# Patient Record
Sex: MALE | Race: WHITE | NOT HISPANIC OR LATINO | Employment: FULL TIME | URBAN - METROPOLITAN AREA
[De-identification: names, ages, dates, MRNs, and addresses within clinical notes are randomized per-mention and may not be internally consistent; named-entity substitution may affect disease eponyms.]

---

## 2018-06-27 ENCOUNTER — RADIANT APPOINTMENT (OUTPATIENT)
Dept: GENERAL RADIOLOGY | Facility: CLINIC | Age: 48
End: 2018-06-27
Attending: FAMILY MEDICINE
Payer: COMMERCIAL

## 2018-06-27 ENCOUNTER — OFFICE VISIT (OUTPATIENT)
Dept: FAMILY MEDICINE | Facility: CLINIC | Age: 48
End: 2018-06-27
Payer: COMMERCIAL

## 2018-06-27 VITALS
TEMPERATURE: 97.9 F | RESPIRATION RATE: 12 BRPM | HEART RATE: 86 BPM | DIASTOLIC BLOOD PRESSURE: 81 MMHG | HEIGHT: 70 IN | WEIGHT: 255 LBS | SYSTOLIC BLOOD PRESSURE: 127 MMHG | BODY MASS INDEX: 36.51 KG/M2 | OXYGEN SATURATION: 97 %

## 2018-06-27 DIAGNOSIS — M25.512 CHRONIC LEFT SHOULDER PAIN: ICD-10-CM

## 2018-06-27 DIAGNOSIS — G89.29 CHRONIC LEFT SHOULDER PAIN: ICD-10-CM

## 2018-06-27 DIAGNOSIS — Z91.89 10 YEAR RISK OF MI OR STROKE < 7.5%: Primary | ICD-10-CM

## 2018-06-27 PROCEDURE — 99213 OFFICE O/P EST LOW 20 MIN: CPT | Performed by: FAMILY MEDICINE

## 2018-06-27 PROCEDURE — 73050 X-RAY EXAM OF SHOULDERS: CPT | Mod: FY

## 2018-06-27 NOTE — PATIENT INSTRUCTIONS
The 10-year ASCVD risk score (Melinda HUNTER Jr et al., 2013) is: 3.1%    Values used to calculate the score:      Age: 47 years      Sex: Male      Is Non- : No      Diabetic: No      Tobacco smoker: No      Systolic Blood Pressure: 127 mmHg      Is BP treated: No      HDL Cholesterol: 41 mg/dL      Total Cholesterol: 213 mg/dL

## 2018-06-27 NOTE — MR AVS SNAPSHOT
After Visit Summary   6/27/2018    Petar Chapman    MRN: [id number]           Patient Information     Date Of Birth          1970        Visit Information        Provider Department      6/27/2018 4:00 PM Beryl Malik MD [facility]        Today's Diagnoses     10 year risk of MI or stroke < 7.5%    -  1    Chronic left shoulder pain          Care Instructions    The 10-year ASCVD risk score (Melinda HUNTER Jr, et al., 2013) is: 3.1%    Values used to calculate the score:      Age: 47 years      Sex: Male      Is Non- : No      Diabetic: No      Tobacco smoker: No      Systolic Blood Pressure: 127 mmHg      Is BP treated: No      HDL Cholesterol: 41 mg/dL      Total Cholesterol: 213 mg/dL            Follow-ups after your visit        Additional Services     ORTHOPEDICS ADULT REFERRAL       Your provider has referred you to: [facility]: [facility] [place] [phone]   [email or web address]    Please be aware that coverage of these services is subject to the terms and limitations of your health insurance plan.  Call member services at your health plan with any benefit or coverage questions.      Please bring the following to your appointment:    >>   Any x-rays, CTs or MRIs which have been performed.  Contact the facility where they were done to arrange for  prior to your scheduled appointment.    >>   List of current medications   >>   This referral request   >>   Any documents/labs given to you for this referral                  Your next 10 appointments already scheduled     Jul 17, 2018  7:30 AM CDT   LAB with AN LAB   [facility] ([facility]    [address] [facility] [address] [phone]           Please do not eat 10-12 hours before your appointment if you are coming in fasting for labs on lipids, cholesterol, or glucose (sugar). This does not apply to  "pregnant women. Water, hot tea and black coffee (with nothing added) are okay. Do not drink other fluids, diet soda or chew gum.            Jul 23, 2018  7:00 AM CDT   PHYSICAL with [facility]ik [facility]   [facility]    [address] [facility] [address] [phone]              Who to contact     If you have questions or need follow up information about today's clinic visit or your schedule please contact [facility] directly at [phone].  Normal or non-critical lab and imaging results will be communicated to you by Compringhart, letter or phone within 4 business days after the clinic has received the results. If you do not hear from us within 7 days, please contact the clinic through AppSociallyt or phone. If you have a critical or abnormal lab result, we will notify you by phone as soon as possible.  Submit refill requests through NavPrescience or call your pharmacy and they will forward the refill request to us. Please allow 3 business days for your refill to be completed.          Additional Information About Your Visit        Compringhart Information     NavPrescience gives you secure access to your electronic health record. If you see a primary care provider, you can also send messages to your care team and make appointments. If you have questions, please call your primary care clinic.  If you do not have a primary care provider, please call [phone] and they will assist you.        Care EveryWhere ID     This is your Care EveryWhere ID. This could be used by other organizations to access your [place] medical records  [id number]        Your Vitals Were     Pulse Temperature Respirations Height Pulse Oximetry BMI (Body Mass Index)    86 97.9  F (36.6  C) (Oral) 12 5' 10\" (1.778 m) 97% 36.59 kg/m2       Blood Pressure from Last 3 Encounters:   06/27/18 127/81   07/14/15 129/88   06/29/15 (!) 129/97    Weight from Last 3 Encounters:   06/27/18 255 lb (115.7 kg) "   07/14/15 240 lb (108.9 kg)   06/29/15 240 lb (108.9 kg)              We Performed the Following     ORTHOPEDICS ADULT REFERRAL        Primary Care Provider Office Phone # Fax #    Peter Armendariz [phone] [id number] [address]        Equal Access to Services     [facility]: Hadii aad ku hadasho Soomaali, waaxda luqadaha, qaybta kaalmada adeegyada, waxay idiin hayaan adeeg kharash la'aan . So [facility] [phone].    ATENCIÓN: Si habla español, tiene a rodriguez disposición servicios gratuitos de asistencia lingüística. Llame al [phone].    We comply with applicable federal civil rights laws and [place] laws. We do not discriminate on the basis of race, color, national origin, age, disability, sex, sexual orientation, or gender identity.            Thank you!     Thank you for choosing [facility]  for your care. Our goal is always to provide you with excellent care. Hearing back from our patients is one way we can continue to improve our services. Please take a few minutes to complete the written survey that you may receive in the mail after your visit with us. Thank you!             Your Updated Medication List - Protect others around you: Learn how to safely use, store and throw away your medicines at www.disposemymeds.org.      Notice  As of 6/27/2018  4:42 PM    You have not been prescribed any medications.

## 2018-06-27 NOTE — PROGRESS NOTES
"  SUBJECTIVE:   Petar Chapman is a 47 year old male who presents to clinic today for the following health issues:      Joint Pain: Left shoulder pain    Onset: last 4 months bad shoulder pain. Been there for years getting worse    Description:   Location: left shoulder  Character: Stabbing    Intensity: moderate    Progression of Symptoms: worse    Accompanying Signs & Symptoms:  Other symptoms: weakness    History:   Previous similar pain: no       Precipitating factors:   Trauma or overuse: no     Alleviating factors:  Improved by: nothing    Therapies Tried and outcome: none    The 10-year ASCVD risk score ([place] ELSA Jr, et al., 2013) is: 3.1%    Values used to calculate the score:      Age: 47 years      Sex: Male      Is Non- : No      Diabetic: No      Tobacco smoker: No      Systolic Blood Pressure: 127 mmHg      Is BP treated: No      HDL Cholesterol: 41 mg/dL      Total Cholesterol: 213 mg/dL      Pt with h/o shoulder separations bilaterally. They have hurt in the past  More recently left one is hurting more than right. Has been getting progressively worse over the past 4 months  Now hurts with nearly every use of that arm    Problem list and histories reviewed & adjusted, as indicated.  Additional history: as documented    Labs reviewed in EPIC    Reviewed and updated as needed this visit by clinical staff  Tobacco  Allergies  Meds  Med Hx  Surg Hx  Fam Hx  Soc Hx      Reviewed and updated as needed this visit by Provider         ROS:  Constitutional, HEENT, cardiovascular, pulmonary, gi and gu systems are negative, except as otherwise noted.    OBJECTIVE:     /81  Pulse 86  Temp 97.9  F (36.6  C) (Oral)  Resp 12  Ht 5' 10\" (1.778 m)  Wt 255 lb (115.7 kg)  SpO2 97%  BMI 36.59 kg/m2  Body mass index is 36.59 kg/(m^2).  GENERAL: healthy, alert and no distress  MS: no gross musculoskeletal defects noted, no edema, pain to palpation over left AC joint. No pain to " palpation over upper left arm  No pain to palpation over cervical spine    Diagnostic Test Results:  Xray - bilateral AC joint:  Arthritic changes noted over left AC joint    ASSESSMENT/PLAN:     1. Chronic left shoulder pain  Referral to sports med/ ? Injection of AC joint  - XR Acromioclavicular Joint Bilateral; Future  - ORTHOPEDICS ADULT REFERRAL    2. 10 year risk of MI or stroke < 7.5%  Reviewed cholesterol results.           Beryl Bower MD  [facility]

## 2018-07-05 ENCOUNTER — DOCUMENTATION ONLY (OUTPATIENT)
Dept: LAB | Facility: CLINIC | Age: 48
End: 2018-07-05

## 2018-07-05 DIAGNOSIS — Z13.1 SCREENING FOR DIABETES MELLITUS: ICD-10-CM

## 2018-07-05 DIAGNOSIS — Z13.6 CARDIOVASCULAR SCREENING; LDL GOAL LESS THAN 160: Primary | ICD-10-CM

## 2018-07-05 NOTE — PROGRESS NOTES
Patient has an upcoming previsit appointment on 07/17/2018. Please review pended orders and add additional orders if needed.     Thank you,   Halina Castellon

## 2018-07-09 ENCOUNTER — OFFICE VISIT (OUTPATIENT)
Dept: ORTHOPEDICS | Facility: CLINIC | Age: 48
End: 2018-07-09
Payer: COMMERCIAL

## 2018-07-09 ENCOUNTER — RADIANT APPOINTMENT (OUTPATIENT)
Dept: GENERAL RADIOLOGY | Facility: CLINIC | Age: 48
End: 2018-07-09
Attending: PEDIATRICS
Payer: COMMERCIAL

## 2018-07-09 VITALS
WEIGHT: 253 LBS | DIASTOLIC BLOOD PRESSURE: 89 MMHG | HEIGHT: 70 IN | SYSTOLIC BLOOD PRESSURE: 128 MMHG | BODY MASS INDEX: 36.22 KG/M2

## 2018-07-09 DIAGNOSIS — M25.512 LEFT SHOULDER PAIN: ICD-10-CM

## 2018-07-09 DIAGNOSIS — M19.019 AC (ACROMIOCLAVICULAR) ARTHRITIS: ICD-10-CM

## 2018-07-09 DIAGNOSIS — G89.29 CHRONIC LEFT SHOULDER PAIN: Primary | ICD-10-CM

## 2018-07-09 DIAGNOSIS — M25.812 SHOULDER IMPINGEMENT, LEFT: ICD-10-CM

## 2018-07-09 DIAGNOSIS — M75.102 ROTATOR CUFF SYNDROME, LEFT: ICD-10-CM

## 2018-07-09 DIAGNOSIS — M25.512 CHRONIC LEFT SHOULDER PAIN: Primary | ICD-10-CM

## 2018-07-09 PROCEDURE — 73030 X-RAY EXAM OF SHOULDER: CPT | Mod: LT

## 2018-07-09 PROCEDURE — 99203 OFFICE O/P NEW LOW 30 MIN: CPT | Performed by: PEDIATRICS

## 2018-07-09 NOTE — PATIENT INSTRUCTIONS
Plan:  - Today's Plan of Care:  Rehab: Physical Therapy: [place] [facility] - [phone]    -We also discussed other future treatment options:  MRI of the shoulder    Follow Up: as needed    If you have any further questions for your physician or physician s care team you can call [phone] and use option 3 to leave a voice message. Calls received during business hours will be returned same day.
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
## 2018-07-09 NOTE — MR AVS SNAPSHOT
After Visit Summary   7/9/2018    Petar Chapman    MRN: [id number]           Patient Information     Date Of Birth          1970        Visit Information        Provider Department      7/9/2018 9:00 AM Sanjuana Lew MD [facility] [place]        Today's Diagnoses     Chronic left shoulder pain    -  1    AC (acromioclavicular) arthritis        Rotator cuff syndrome, left        Shoulder impingement, left          Care Instructions        Plan:  - Today's Plan of Care:  Rehab: Physical Therapy: [facility] - [phone]    -We also discussed other future treatment options:  MRI of the shoulder    Follow Up: as needed    If you have any further questions for your physician or physician s care team you can call [phone] and use option 3 to leave a voice message. Calls received during business hours will be returned same day.              Follow-ups after your visit        Additional Services     MEDHAT PT, HAND, AND CHIROPRACTIC REFERRAL       **This order will print in the [facility] Scheduling Office**    Physical Therapy, Hand Therapy and Chiropractic Care are available through:    *[facility]  *[facility]  *[facility]    Call one number to schedule at any of the above locations: [phone].    Your provider has referred you to: Physical Therapy at [facility] or [facility]    Indication/Reason for Referral: Shoulder Pain  Onset of Illness: chronic  Therapy Orders: Evaluate and Treat  Special Programs:None  Special Request: None    Christy Harrison      Additional Comments for the Therapist or Chiropractor:     Please be aware that coverage of these services is subject to the terms and limitations of your health insurance plan.  Call member services at your health plan with any benefit or coverage questions.      Please bring the following to your appointment:    *Your personal calendar for scheduling future  appointments  *Comfortable clothing                  Your next 10 appointments already scheduled     Jul 17, 2018  7:30 AM CDT   [facility]    [address] [address] [phone]           Please do not eat 10-12 hours before your appointment if you are coming in fasting for labs on lipids, cholesterol, or glucose (sugar). This does not apply to pregnant women. Water, hot tea and black coffee (with nothing added) are okay. Do not drink other fluids, diet soda or chew gum.            Jul 23, 2018  7:00 AM CDT   [facility]YSICAL [facility]   [facility]    [address] [address] [phone]              Who to contact     If you have questions or need follow up information about today's clinic visit or your schedule please contact [facility] directly at [phone].  Normal or non-critical lab and imaging results will be communicated to you by [employer], letter or phone within 4 business days after the clinic has received the results. If you do not hear from us within 7 days, please contact the clinic through [employer] or phone. If you have a critical or abnormal lab result, we will notify you by phone as soon as possible.  Submit refill requests through [employer] or call your pharmacy and they will forward the refill request to us. Please allow 3 business days for your refill to be completed.          Additional Information About Your Visit        [employer] Information     [employer] gives you secure access to your electronic health record. If you see a primary care provider, you can also send messages to your care team and make appointments. If you have questions, please call your primary care clinic.  If you do not have a primary care provider, please call [phone] and they will assist you.        Care EveryWhere ID     This is your Care  "EveryWhere ID. This could be used by other organizations to access your [place] medical records  [id number]        Your Vitals Were     Height BMI (Body Mass Index)                5' 10\" (1.778 m) 36.3 kg/m2           Blood Pressure from Last 3 Encounters:   07/09/18 128/89   06/27/18 127/81   07/14/15 129/88    Weight from Last 3 Encounters:   07/09/18 253 lb (114.8 kg)   06/27/18 255 lb (115.7 kg)   07/14/15 240 lb (108.9 kg)              We Performed the Following     MEDHAT PT, HAND, AND CHIROPRACTIC REFERRAL        Primary Care Provider Office Phone # Fax #    Peter Armendariz [phone] [address] [address]        Equal Access to Services     [facility]: Hadii brian briones hadasho Sodanna, waaxda luqadaha, qaybta kaalmada adeegyada, paco parnell . So [facility] [phone].    ATENCIÓN: Si habla español, tiene a rodriguez disposición servicios gratuitos de asistencia lingüística. Oni al [phone].    We comply with applicable federal civil rights laws and [place] laws. We do not discriminate on the basis of race, color, national origin, age, disability, sex, sexual orientation, or gender identity.            Thank you!     Thank you for choosing [facility]  for your care. Our goal is always to provide you with excellent care. Hearing back from our patients is one way we can continue to improve our services. Please take a few minutes to complete the written survey that you may receive in the mail after your visit with us. Thank you!             Your Updated Medication List - Protect others around you: Learn how to safely use, store and throw away your medicines at www.disposemymeds.org.      Notice  As of 7/9/2018  9:36 AM    You have not been prescribed any medications.      "

## 2018-07-17 DIAGNOSIS — Z13.6 CARDIOVASCULAR SCREENING; LDL GOAL LESS THAN 160: ICD-10-CM

## 2018-07-17 DIAGNOSIS — Z13.1 SCREENING FOR DIABETES MELLITUS: ICD-10-CM

## 2018-07-17 LAB
CHOLEST SERPL-MCNC: 199 MG/DL
GLUCOSE SERPL-MCNC: 114 MG/DL (ref 70–99)
HDLC SERPL-MCNC: 42 MG/DL
LDLC SERPL CALC-MCNC: 121 MG/DL
NONHDLC SERPL-MCNC: 157 MG/DL
TRIGL SERPL-MCNC: 180 MG/DL

## 2018-07-17 PROCEDURE — 80061 LIPID PANEL: CPT | Performed by: FAMILY MEDICINE

## 2018-07-17 PROCEDURE — 36415 COLL VENOUS BLD VENIPUNCTURE: CPT | Performed by: FAMILY MEDICINE

## 2018-07-17 PROCEDURE — 82947 ASSAY GLUCOSE BLOOD QUANT: CPT | Performed by: FAMILY MEDICINE

## 2018-07-18 NOTE — PROGRESS NOTES
SUBJECTIVE:   CC: Petar Chapman is an 48 year old male who presents for preventative health visit.     Physical   Annual:     Getting at least 3 servings of Calcium per day:  NO    Bi-annual eye exam:  Yes    Dental care twice a year:  Yes    Sleep apnea or symptoms of sleep apnea:  Daytime drowsiness and Excessive snoring    Diet:  Regular (no restrictions)    Taking medications regularly:  Not Applicable    Medication side effects:  Not applicable    The 10-year ASCVD risk score (Greenviewbailee HUNTER Jr, et al., 2013) is: 3.2%    Values used to calculate the score:      Age: 48 years      Sex: Male      Is Non- : No      Diabetic: No      Tobacco smoker: No      Systolic Blood Pressure: 131 mmHg      Is BP treated: No      HDL Cholesterol: 42 mg/dL      Total Cholesterol: 199 mg/dL          Today's PHQ-2 Score:   PHQ-2 ( 1999 [employer]) 7/23/2018   Q1: Little interest or pleasure in doing things 0   Q2: Feeling down, depressed or hopeless 0   PHQ-2 Score 0   Q1: Little interest or pleasure in doing things Not at all   Q2: Feeling down, depressed or hopeless Not at all   PHQ-2 Score 0       Abuse: Current or Past(Physical, Sexual or Emotional)- No  Do you feel safe in your environment - Yes    Social History   Substance Use Topics     Smoking status: Former Smoker     Smokeless tobacco: Former User     Quit date: 3/15/1995      Comment: snuff   2010     Alcohol use Yes     Alcohol Use 7/23/2018   If you drink alcohol do you typically have greater than 3 drinks per day OR greater than 7 drinks per week? No     Less than 7 drinks per week on the average  Last PSA:   PSA   Date Value Ref Range Status   05/22/2012 3.78 0 - 4 ug/L Final       Reviewed orders with patient. Reviewed health maintenance and updated orders accordingly - Yes  Labs reviewed in EPIC    Reviewed and updated as needed this visit by clinical staff  Tobacco  Allergies  Meds  Med Hx  Surg Hx  Fam Hx  Soc Hx        Reviewed and  "updated as needed this visit by Provider            Review of Systems  CONSTITUTIONAL: NEGATIVE for fever, chills, change in weight  INTEGUMENTARY/SKIN: NEGATIVE for worrisome rashes, moles or lesions  EYES: NEGATIVE for vision changes or irritation  ENT: NEGATIVE for ear, mouth and throat problems  RESP: NEGATIVE for significant cough or SOB  CV: NEGATIVE for chest pain, palpitations or peripheral edema  GI: NEGATIVE for nausea, abdominal pain, heartburn, or change in bowel habits   male: negative for dysuria, hematuria, decreased urinary stream, erectile dysfunction, urethral discharge  MUSCULOSKELETAL: NEGATIVE for significant arthralgias or myalgia  NEURO: NEGATIVE for weakness, dizziness or paresthesias  PSYCHIATRIC: NEGATIVE for changes in mood or affect    OBJECTIVE:   /88  Pulse 80  Temp 97.3  F (36.3  C) (Oral)  Resp 17  Ht 5' 10\" (1.778 m)  Wt 252 lb (114.3 kg)  SpO2 94%  BMI 36.16 kg/m2    Physical Exam  GENERAL: healthy, alert and no distress  EYES: Eyes grossly normal to inspection, PERRL and conjunctivae and sclerae normal  HENT: ear canals and TM's normal, nose and mouth without ulcers or lesions  NECK: no adenopathy, no asymmetry, masses, or scars and thyroid normal to palpation  RESP: lungs clear to auscultation - no rales, rhonchi or wheezes  CV: regular rate and rhythm, normal S1 S2, no S3 or S4, no murmur, click or rub, no peripheral edema and peripheral pulses strong  ABDOMEN: soft, nontender, no hepatosplenomegaly, no masses and bowel sounds normal  MS: no gross musculoskeletal defects noted, no edema  SKIN: no suspicious lesions or rashes  NEURO: Normal strength and tone, mentation intact and speech normal  PSYCH: mentation appears normal, affect normal/bright    Diagnostic Test Results:  none     ASSESSMENT/PLAN:   1. Routine general medical examination at a health care facility      2. Elevated glucose  Recheck a1c  - Hemoglobin A1c    3. Class 2 obesity due to excess calories " "without serious comorbidity with body mass index (BMI) of 35.0 to 35.9 in adult        COUNSELING:   Reviewed preventive health counseling, as reflected in patient instructions       Regular exercise       Healthy diet/nutrition       Vision screening    BP Readings from Last 1 Encounters:   07/23/18 131/88     Estimated body mass index is 36.16 kg/(m^2) as calculated from the following:    Height as of this encounter: 5' 10\" (1.778 m).    Weight as of this encounter: 252 lb (114.3 kg).    BP Screening:   Last 3 BP Readings:    BP Readings from Last 3 Encounters:   07/23/18 131/88   07/09/18 128/89   06/27/18 127/81       The following was recommended to the patient:  Re-screen BP within a year and recommended lifestyle modifications  Weight management plan: Discussed healthy diet and exercise guidelines and patient will follow up in 12 months in clinic to re-evaluate.     reports that he has quit smoking. He quit smokeless tobacco use about 23 years ago.      Counseling Resources:  ATP IV Guidelines  Pooled Cohorts Equation Calculator  FRAX Risk Assessment  ICSI Preventive Guidelines  Dietary Guidelines for Americans, 2010  [employer]'s MyPlate  ASA Prophylaxis  Lung CA Screening    Beryl Bower MD  [facility] [place]  Answers for HPI/ROS submitted by the patient on 7/23/2018   PHQ-2 Score: 0    "

## 2018-07-23 ENCOUNTER — OFFICE VISIT (OUTPATIENT)
Dept: FAMILY MEDICINE | Facility: CLINIC | Age: 48
End: 2018-07-23
Payer: COMMERCIAL

## 2018-07-23 ENCOUNTER — THERAPY VISIT (OUTPATIENT)
Dept: PHYSICAL THERAPY | Facility: CLINIC | Age: 48
End: 2018-07-23
Payer: COMMERCIAL

## 2018-07-23 VITALS
HEIGHT: 70 IN | DIASTOLIC BLOOD PRESSURE: 88 MMHG | HEART RATE: 80 BPM | WEIGHT: 252 LBS | TEMPERATURE: 97.3 F | RESPIRATION RATE: 17 BRPM | BODY MASS INDEX: 36.08 KG/M2 | OXYGEN SATURATION: 94 % | SYSTOLIC BLOOD PRESSURE: 131 MMHG

## 2018-07-23 DIAGNOSIS — E66.812 CLASS 2 OBESITY DUE TO EXCESS CALORIES WITHOUT SERIOUS COMORBIDITY WITH BODY MASS INDEX (BMI) OF 35.0 TO 35.9 IN ADULT: ICD-10-CM

## 2018-07-23 DIAGNOSIS — M25.512 SHOULDER PAIN, LEFT: Primary | ICD-10-CM

## 2018-07-23 DIAGNOSIS — R73.09 ELEVATED GLUCOSE: ICD-10-CM

## 2018-07-23 DIAGNOSIS — E66.09 CLASS 2 OBESITY DUE TO EXCESS CALORIES WITHOUT SERIOUS COMORBIDITY WITH BODY MASS INDEX (BMI) OF 35.0 TO 35.9 IN ADULT: ICD-10-CM

## 2018-07-23 DIAGNOSIS — Z00.00 ROUTINE GENERAL MEDICAL EXAMINATION AT A HEALTH CARE FACILITY: Primary | ICD-10-CM

## 2018-07-23 PROBLEM — E66.01 MORBID OBESITY (H): Status: ACTIVE | Noted: 2018-07-23

## 2018-07-23 PROBLEM — E66.01 MORBID OBESITY (H): Status: RESOLVED | Noted: 2018-07-23 | Resolved: 2018-07-23

## 2018-07-23 LAB — HBA1C MFR BLD: 5.7 % (ref 0–5.6)

## 2018-07-23 PROCEDURE — 83036 HEMOGLOBIN GLYCOSYLATED A1C: CPT | Performed by: FAMILY MEDICINE

## 2018-07-23 PROCEDURE — 97161 PT EVAL LOW COMPLEX 20 MIN: CPT | Mod: GP | Performed by: PHYSICAL THERAPIST

## 2018-07-23 PROCEDURE — 36415 COLL VENOUS BLD VENIPUNCTURE: CPT | Performed by: FAMILY MEDICINE

## 2018-07-23 PROCEDURE — 97110 THERAPEUTIC EXERCISES: CPT | Mod: GP | Performed by: PHYSICAL THERAPIST

## 2018-07-23 PROCEDURE — 99396 PREV VISIT EST AGE 40-64: CPT | Performed by: FAMILY MEDICINE

## 2018-07-23 ASSESSMENT — PAIN SCALES - GENERAL: PAINLEVEL: NO PAIN (0)

## 2018-07-23 NOTE — MR AVS SNAPSHOT
"              After Visit Summary   7/23/2018    Petar Chapman    MRN: [id number]           Patient Information     Date Of Birth          1970        Visit Information        Provider Department      7/23/2018 7:00 AM Beryl Malik MD [facility]        Today's Diagnoses     Routine general medical examination at a health care facility    -  1    Elevated glucose        Class 2 obesity due to excess calories without serious comorbidity with body mass index (BMI) of 35.0 to 35.9 in adult           Follow-ups after your visit        Your next 10 appointments already scheduled     Jul 23, 2018 11:20 AM CDT   (Arrive by 11:05 AM)   MEDHAT Extremity with Tina [place] PT   [facility] [place] [facility] (MED[facility]AT [facility] [place])    [address]  [address]  [place] [address] [phone]              Who to contact     If you have questions or need follow up information about today's clinic visit or your schedule please contact [facility] directly at [phone].  Normal or non-critical lab and imaging results will be communicated to you by [employer], letter or phone within 4 business days after the clinic has received the results. If you do not hear from us within 7 days, please contact the clinic through [employer] or phone. If you have a critical or abnormal lab result, we will notify you by phone as soon as possible.  Submit refill requests through [employer] or call your pharmacy and they will forward the refill request to us. Please allow 3 business days for your refill to be completed.          Additional Information About Your Visit        [employer] Information     [employer] lets you send messages to your doctor, view your test results, renew your prescriptions, schedule appointments and more. To sign up, go to [email or web address] . Click on \"Log in\" on the left side of the screen, which will take you to the Welcome page. Then click on \"Sign up Now\" on the right " "side of the page.     You will be asked to enter the access code listed below, as well as some personal information. Please follow the directions to create your username and password.     Your access code is: [email or web address]  Expires: 10/21/2018  8:28 AM     Your access code will  in 90 days. If you need help or a new code, please call your [place] clinic or [phone].        Care EveryWhere ID     This is your Care EveryWhere ID. This could be used by other organizations to access your [place] medical records  [id number]        Your Vitals Were     Pulse Temperature Respirations Height Pulse Oximetry BMI (Body Mass Index)    80 97.3  F (36.3  C) (Oral) 17 5' 10\" (1.778 m) 94% 36.16 kg/m2       Blood Pressure from Last 3 Encounters:   18 131/88   18 128/89   18 127/81    Weight from Last 3 Encounters:   18 252 lb (114.3 kg)   18 253 lb (114.8 kg)   18 255 lb (115.7 kg)              We Performed the Following     Hemoglobin A1c        Primary Care Provider Office Phone # Fax #    Peter Armendariz [phone] [address] [address]        Equal Access to Services     [facility] MELANIE : Hadii aad ku hadasho Soomaali, waaxda luqadaha, qaybta kaalmada adeegyada, paco parnell . So [facility] [phone].    ATENCIÓN: Si habla español, tiene a rodriguez disposición servicios gratuitos de asistencia lingüística. Llame al [phone].    We comply with applicable federal civil rights laws and [place] laws. We do not discriminate on the basis of race, color, national origin, age, disability, sex, sexual orientation, or gender identity.            Thank you!     Thank you for choosing [facility]  for your care. Our goal is always to provide you with excellent care. Hearing back from our patients is one way we can continue to improve our services. Please take a few minutes to complete the written survey that you may " receive in the mail after your visit with us. Thank you!             Your Updated Medication List - Protect others around you: Learn how to safely use, store and throw away your medicines at www.disposemymeds.org.      Notice  As of 7/23/2018  8:28 AM    You have not been prescribed any medications.

## 2018-07-23 NOTE — LETTER
July 24, 2018      Petar Chapman  [address] [address]        Dear ,    We are writing to inform you of your test results.      Depending on which guidelines used, your test for diabetes is normal to borderline prediabetes.   I would encourage regular exercise for weight loss and this number should improve.   Resulted Orders   Hemoglobin A1c   Result Value Ref Range    Hemoglobin A1C 5.7 (H) 0 - 5.6 %      Comment:      Normal <5.7% Prediabetes 5.7-6.4%  Diabetes 6.5% or higher - adopted from ADA   consensus guidelines.         If you have any questions or concerns, please call the clinic at the number listed above.       Sincerely,        Beryl Bower MD

## 2018-07-23 NOTE — MR AVS SNAPSHOT
After Visit Summary   7/23/2018    Petar Chapman    MRN: [id number]           Patient Information     Date Of Birth          1970        Visit Information        Provider Department      7/23/2018 11:20 AM Tina [place], PT [facility] [place] [facility]        Today's Diagnoses     Shoulder pain, left    -  1       Follow-ups after your visit        Your next 10 appointments already scheduled     Aug 06, 2018  9:20 AM CDT   MEDHAT Extremity with Beryl Childress, PT   [facility] [place] [facility] ([facility] [place])    [address]  [place] [address]   [phone]            Aug 13, 2018  9:20 AM CDT   MEDHAT Extremity with Prema Medina, PT   [facility] [place] [facility] ([facility] [place])    [address]  [place] [address]   [phone]            Aug 20, 2018  9:20 AM CDT   MEDHAT Extremity with Beryl Childress, PT   [facility] ([facility] [place])    [address]  [place] [address]   [phone]              Who to contact     If you have questions or need follow up information about today's clinic visit or your schedule please contact [facility] [place] [facility] directly at [phone].  Normal or non-critical lab and imaging results will be communicated to you by [employer], letter or phone within 4 business days after the clinic has received the results. If you do not hear from us within 7 days, please contact the clinic through [employer] or phone. If you have a critical or abnormal lab result, we will notify you by phone as soon as possible.  Submit refill requests through [email or web address] or call your pharmacy and they will forward the refill request to us. Please allow 3 business days for your refill to be completed.          Additional Information About Your Visit        [employer] Information     [email or web address] lets you send messages to your doctor,  "view your test results, renew your prescriptions, schedule appointments and more. To sign up, go to [email or web address] . Click on \"Log in\" on the left side of the screen, which will take you to the Welcome page. Then click on \"Sign up Now\" on the right side of the page.     You will be asked to enter the access code listed below, as well as some personal information. Please follow the directions to create your username and password.     Your access code is: [email or web address]  Expires: 10/21/2018  8:28 AM     Your access code will  in 90 days. If you need help or a new code, please call your [facility] clinic or [phone].        Care EveryWhere ID     This is your Care EveryWhere ID. This could be used by other organizations to access your [place] medical records  [id number]         Blood Pressure from Last 3 Encounters:   18 131/88   18 128/89   18 127/81    Weight from Last 3 Encounters:   18 114.3 kg (252 lb)   18 114.8 kg (253 lb)   18 115.7 kg (255 lb)              We Performed the Following     HC PT EVAL, LOW COMPLEXITY     MEDHAT INITIAL EVAL REPORT     THERAPEUTIC EXERCISES        Primary Care Provider Office Phone # Fax #    Peter Armendariz [phone] [address] [address] [address]        Equal Access to Services     [facility] : Hadii brian ku hadasho Soomaali, waaxda luqadaha, qaybta kaalmada adeegyada, paco parnell . So [facility] [phone].    ATENCIÓN: Si habla nahomi, tiene a rodriguez disposición servicios gratuitos de asistencia lingüística. Oni al [phone].    We comply with applicable federal civil rights laws and [place] laws. We do not discriminate on the basis of race, color, national origin, age, disability, sex, sexual orientation, or gender identity.            Thank you!     Thank you for choosing [facility]  for your care. Our goal is always to provide you " with excellent care. Hearing back from our patients is one way we can continue to improve our services. Please take a few minutes to complete the written survey that you may receive in the mail after your visit with us. Thank you!             Your Updated Medication List - Protect others around you: Learn how to safely use, store and throw away your medicines at www.disposemymeds.org.      Notice  As of 7/23/2018  3:10 PM    You have not been prescribed any medications.
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
## 2018-08-06 ENCOUNTER — THERAPY VISIT (OUTPATIENT)
Dept: PHYSICAL THERAPY | Facility: CLINIC | Age: 48
End: 2018-08-06
Payer: COMMERCIAL

## 2018-08-06 DIAGNOSIS — M25.512 LEFT SHOULDER PAIN, UNSPECIFIED CHRONICITY: ICD-10-CM

## 2018-08-06 PROCEDURE — 97110 THERAPEUTIC EXERCISES: CPT | Mod: GP | Performed by: PHYSICAL THERAPIST

## 2018-08-06 PROCEDURE — 97112 NEUROMUSCULAR REEDUCATION: CPT | Mod: GP | Performed by: PHYSICAL THERAPIST

## 2019-07-03 ENCOUNTER — OFFICE VISIT (OUTPATIENT)
Dept: FAMILY MEDICINE | Facility: CLINIC | Age: 49
End: 2019-07-03
Payer: COMMERCIAL

## 2019-07-03 VITALS
RESPIRATION RATE: 18 BRPM | HEIGHT: 70 IN | DIASTOLIC BLOOD PRESSURE: 81 MMHG | BODY MASS INDEX: 34.22 KG/M2 | WEIGHT: 239 LBS | OXYGEN SATURATION: 96 % | TEMPERATURE: 98.4 F | SYSTOLIC BLOOD PRESSURE: 129 MMHG | HEART RATE: 76 BPM

## 2019-07-03 DIAGNOSIS — H65.192 ACUTE EFFUSION OF LEFT EAR: Primary | ICD-10-CM

## 2019-07-03 PROCEDURE — 99213 OFFICE O/P EST LOW 20 MIN: CPT | Performed by: FAMILY MEDICINE

## 2019-07-03 ASSESSMENT — MIFFLIN-ST. JEOR: SCORE: 1957.41

## 2019-07-03 NOTE — PROGRESS NOTES
"Subjective     Petar Chapman is a 48 year old male who presents to clinic today for the following health issues:    HPI   Left ear pain x 5 days, now hurting down in to jaw and ringing    No other cold symptoms    Pt with left ear feeling plugged, decreased hearing and ringing for past 5 days  Is going camping this weekend and wants to make sure there is no infection.   Does have some pain in left neck as well      Reviewed and updated as needed this visit by Provider         Review of Systems   ROS COMP: Constitutional, HEENT, cardiovascular, pulmonary, gi and gu systems are negative, except as otherwise noted.      Objective    /81   Pulse 76   Temp 98.4  F (36.9  C) (Oral)   Resp 18   Ht 1.765 m (5' 9.5\")   Wt 108.4 kg (239 lb)   SpO2 96%   BMI 34.79 kg/m    Body mass index is 34.79 kg/m .  Physical Exam   GENERAL: healthy, alert and no distress  HENT: normal cephalic/atraumatic, left ear: clear effusion, nose and mouth without ulcers or lesions, oropharynx clear and oral mucous membranes moist    Diagnostic Test Results:  Labs reviewed in Epic        Assessment & Plan     1. Acute effusion of left ear  Discussed trial of nasal spray to open sinuses for ear to drain, popping his ear. If not resolving in a month or getting worse, needs to follow-up        BMI:   Estimated body mass index is 34.79 kg/m  as calculated from the following:    Height as of this encounter: 1.765 m (5' 9.5\").    Weight as of this encounter: 108.4 kg (239 lb).   Weight management plan: Discussed healthy diet and exercise guidelines            No follow-ups on file.    Beryl Bower MD  [facility]    "

## 2019-07-03 NOTE — PROGRESS NOTES
"Subjective     Petar Chapman is a 48 year old male who presents to clinic today for the following health issues:    HPI   ENT Symptoms             Symptoms: cc Present Absent Comment   Fever/Chills       Fatigue       Muscle Aches       Eye Irritation       Sneezing       Nasal Stephen/Drg       Sinus Pressure/Pain       Loss of smell       Dental pain       Sore Throat       Swollen Glands       Ear Pain/Fullness       Cough       Wheeze       Chest Pain       Shortness of breath       Rash       Other         Symptom duration:  ***   Symptom severity:  ***   Treatments tried:  ***   Contacts:  ***       {additonal problems for provider to add (Optional):824945}    {HIST REVIEW/ LINKS 2 (Optional):542848}    {Additional problems for the provider to add (optional):002186}  Reviewed and updated as needed this visit by Provider         Review of Systems   {ROS COMP (Optional):713332}      Objective    There were no vitals taken for this visit.  There is no height or weight on file to calculate BMI.  Physical Exam   {Exam List (Optional):783665}    {Diagnostic Test Results (Optional):049770::\"Diagnostic Test Results:\",\"Labs reviewed in Epic\"}        {PROVIDER CHARTING PREFERENCE:113671}        "

## 2020-06-18 ENCOUNTER — OFFICE VISIT (OUTPATIENT)
Dept: FAMILY MEDICINE | Facility: CLINIC | Age: 50
End: 2020-06-18
Payer: COMMERCIAL

## 2020-06-18 VITALS
HEART RATE: 81 BPM | TEMPERATURE: 98.4 F | DIASTOLIC BLOOD PRESSURE: 88 MMHG | WEIGHT: 245 LBS | RESPIRATION RATE: 16 BRPM | SYSTOLIC BLOOD PRESSURE: 120 MMHG | OXYGEN SATURATION: 97 % | BODY MASS INDEX: 35.07 KG/M2 | HEIGHT: 70 IN

## 2020-06-18 DIAGNOSIS — M79.674 PAIN OF TOE OF RIGHT FOOT: Primary | ICD-10-CM

## 2020-06-18 DIAGNOSIS — S93.402A SPRAIN OF LEFT ANKLE, UNSPECIFIED LIGAMENT, INITIAL ENCOUNTER: ICD-10-CM

## 2020-06-18 PROCEDURE — 99214 OFFICE O/P EST MOD 30 MIN: CPT | Performed by: PHYSICIAN ASSISTANT

## 2020-06-18 ASSESSMENT — MIFFLIN-ST. JEOR: SCORE: 1979.62

## 2020-06-18 NOTE — PROGRESS NOTES
Subjective     Petar Chapman is a 49 year old male who presents to clinic today for the following health issues:    HPI       1)   Check toe on the R foot per pt, mild pain and swelling/ecchymosis from the fall also. Didn't notice until later.     2)  Joint Pain    Onset: x 1 day    Description:   Location: L ankle and R toe next to big toe injury (2nd digit right foot)  Character: Dull ache, Stabbing, Burning and Cramping    Intensity: moderate    Progression of Symptoms: worse    Accompanying Signs & Symptoms:  Other symptoms: numbness, warmth, swelling and redness    History:   Previous similar pain: no       Precipitating factors:   Trauma or overuse: YES- Per pt landed on the ground when stepping on an uneven edge.  This occurred yesterday.    Alleviating factors:  Improved by: rest/inactivity and ice    Therapies Tried and outcome: Noted above and helped a little.    Patient feels much better today than yesterday.  He does not want x-ray unless absolutely necessary.  He has sprained his ankle in the past.  Swelling is much improved today.  He has range of motion.  He has not been using a support or wrap.  He thinks he has ankle brace at home however.  No pain at rest just with walking.  Toe does not really hurt today.  Patient is obese.  He is allergic to ibuprofen.    Patient Active Problem List   Diagnosis     CARDIOVASCULAR SCREENING; LDL GOAL LESS THAN 160     Hearing loss     Family history of early CAD     Obesity     Shoulder pain, left     Past Surgical History:   Procedure Laterality Date     ORTHOPEDIC SURGERY  1982    right leg       Social History     Tobacco Use     Smoking status: Former Smoker     Smokeless tobacco: Former User     Quit date: 3/15/1995     Tobacco comment: snuff   2010   Substance Use Topics     Alcohol use: Yes     Comment: OCC     Family History   Problem Relation Age of Onset     Heart Disease Father 32        mi non-smoker     Cancer Maternal Grandmother      Heart Disease  "Paternal Grandmother          No current outpatient medications on file.     Allergies   Allergen Reactions     Asa [Aspirin] Swelling     Ibuprofen Swelling     Face edematous with aspirin         Reviewed and updated as needed this visit by Provider         Review of Systems   Constitutional, HEENT, cardiovascular, pulmonary, GI, , musculoskeletal, neuro, skin, endocrine and psych systems are negative, except as otherwise noted.      Objective    /88   Pulse 81   Temp 98.4  F (36.9  C) (Tympanic)   Resp 16   Ht 1.765 m (5' 9.5\")   Wt 111.1 kg (245 lb)   SpO2 97%   BMI 35.66 kg/m    Body mass index is 35.66 kg/m .  Physical Exam   GENERAL: alert, no distress and obese  RESP: lungs clear to auscultation - no rales, rhonchi or wheezes  CV: regular rate and rhythm, normal S1 S2, no S3 or S4, no murmur, click or rub, no peripheral edema and peripheral pulses strong  MS: {:Ortho: left ankle-No gross deformity.  No erythema.  Mild edema and tenderness lateral posterior to lateral malleolous.  No ecchymosis on ankle. No warmth.     Range of motion intact fully.  Sensation intact distally.  Distal pulses strong.  Knee and ankle strength 5/5 and equal bilaterally.  Anterior drawer sign negative.    Toe:  2nd digit right foot--significant ecchymosis present.  Mild tenderness.  Distal sensation intact.  Temperature is appropriate.  Cap refill normal.  Range of motion normal.    SKIN: no suspicious lesions or rashes  NEURO: Normal strength and tone, mentation intact and speech normal  PSYCH: mentation appears normal, affect normal/bright    Diagnostic Test Results:  Labs reviewed in Epic        Assessment & Plan     1. Pain of toe of right foot  We discussed that x-ray would not change our plan today as he did not injure his big toe and his exam is benign  Consider x-ray in future if symptoms worsen or change or do not improve over the next month  We will buddy tape for 2 weeks or until pain-free  Likely " "sprain    2. Sprain of left ankle, unspecified ligament, initial encounter  It is very reassuring that he is already much better today.  Exam is benign.  Patient wants to use is own ankle support.  See more below  Consider PT in future if needed given history of previous sprains     BMI:   Estimated body mass index is 35.66 kg/m  as calculated from the following:    Height as of this encounter: 1.765 m (5' 9.5\").    Weight as of this encounter: 111.1 kg (245 lb).   Weight management plan: Discussed healthy diet and exercise guidelines        Patient Instructions   Continue rest, ice, elevation  Tylenol if needed  Let me know if symptoms worsen or change and we can get an xray  Osmany tape your toe until pain free with walking  Wear ankle support for at least two weeks then you can stop if completely pain free with walking          Return in about 1 week (around 6/25/2020) for if not improving.    Stacie Arriaza PA-C  [facility]        "

## 2021-07-21 ENCOUNTER — TELEPHONE (OUTPATIENT)
Dept: FAMILY MEDICINE | Facility: CLINIC | Age: 51
End: 2021-07-21

## 2021-07-21 NOTE — TELEPHONE ENCOUNTER
Please help him set up in-person or telephone/video appointment with the 1st available provider.    Since we have not evaluated him for covid, I'm not able to write a work release at this time.    Erik Taylor M.D.

## 2021-07-21 NOTE — TELEPHONE ENCOUNTER
Patient is calling. He needs a note to go back to work. He said he had a positive covid test on 7/12/21. Through [facility])His symptoms first started on 7/7/21.Please advise if this is something you can do.Lindy Santos MA/TC
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
## 2021-11-01 ENCOUNTER — OFFICE VISIT (OUTPATIENT)
Dept: FAMILY MEDICINE | Facility: CLINIC | Age: 51
End: 2021-11-01
Payer: COMMERCIAL

## 2021-11-01 ENCOUNTER — TELEPHONE (OUTPATIENT)
Dept: FAMILY MEDICINE | Facility: CLINIC | Age: 51
End: 2021-11-01

## 2021-11-01 VITALS
DIASTOLIC BLOOD PRESSURE: 89 MMHG | HEIGHT: 70 IN | TEMPERATURE: 96.4 F | HEART RATE: 78 BPM | BODY MASS INDEX: 35.93 KG/M2 | SYSTOLIC BLOOD PRESSURE: 128 MMHG | WEIGHT: 251 LBS | OXYGEN SATURATION: 97 %

## 2021-11-01 DIAGNOSIS — L91.8 SKIN TAG: ICD-10-CM

## 2021-11-01 DIAGNOSIS — Z12.5 SCREENING FOR PROSTATE CANCER: ICD-10-CM

## 2021-11-01 DIAGNOSIS — R06.83 SNORING: ICD-10-CM

## 2021-11-01 DIAGNOSIS — Z13.1 SCREENING FOR DIABETES MELLITUS: ICD-10-CM

## 2021-11-01 DIAGNOSIS — R97.20 ELEVATED PROSTATE SPECIFIC ANTIGEN (PSA): Primary | ICD-10-CM

## 2021-11-01 DIAGNOSIS — Z13.220 SCREENING, LIPID: ICD-10-CM

## 2021-11-01 DIAGNOSIS — R74.8 ELEVATED LIVER ENZYMES: ICD-10-CM

## 2021-11-01 DIAGNOSIS — Z00.00 ROUTINE GENERAL MEDICAL EXAMINATION AT A HEALTH CARE FACILITY: Primary | ICD-10-CM

## 2021-11-01 LAB
ALBUMIN SERPL-MCNC: 4 G/DL (ref 3.4–5)
ALP SERPL-CCNC: 112 U/L (ref 40–150)
ALT SERPL W P-5'-P-CCNC: 104 U/L (ref 0–70)
ANION GAP SERPL CALCULATED.3IONS-SCNC: 6 MMOL/L (ref 3–14)
AST SERPL W P-5'-P-CCNC: 40 U/L (ref 0–45)
BILIRUB SERPL-MCNC: 0.7 MG/DL (ref 0.2–1.3)
BUN SERPL-MCNC: 21 MG/DL (ref 7–30)
CALCIUM SERPL-MCNC: 9.4 MG/DL (ref 8.5–10.1)
CHLORIDE BLD-SCNC: 105 MMOL/L (ref 94–109)
CHOLEST SERPL-MCNC: 209 MG/DL
CO2 SERPL-SCNC: 26 MMOL/L (ref 20–32)
CREAT SERPL-MCNC: 1.13 MG/DL (ref 0.66–1.25)
FASTING STATUS PATIENT QL REPORTED: YES
GFR SERPL CREATININE-BSD FRML MDRD: 75 ML/MIN/1.73M2
GLUCOSE BLD-MCNC: 111 MG/DL (ref 70–99)
HBA1C MFR BLD: 5.9 % (ref 0–5.6)
HDLC SERPL-MCNC: 45 MG/DL
LDLC SERPL CALC-MCNC: 133 MG/DL
NONHDLC SERPL-MCNC: 164 MG/DL
POTASSIUM BLD-SCNC: 3.9 MMOL/L (ref 3.4–5.3)
PROT SERPL-MCNC: 7.7 G/DL (ref 6.8–8.8)
PSA SERPL-MCNC: 12 UG/L (ref 0–4)
SODIUM SERPL-SCNC: 137 MMOL/L (ref 133–144)
TRIGL SERPL-MCNC: 153 MG/DL

## 2021-11-01 PROCEDURE — 80061 LIPID PANEL: CPT | Performed by: PHYSICIAN ASSISTANT

## 2021-11-01 PROCEDURE — 36415 COLL VENOUS BLD VENIPUNCTURE: CPT | Performed by: PHYSICIAN ASSISTANT

## 2021-11-01 PROCEDURE — 90682 RIV4 VACC RECOMBINANT DNA IM: CPT | Performed by: PHYSICIAN ASSISTANT

## 2021-11-01 PROCEDURE — 99396 PREV VISIT EST AGE 40-64: CPT | Mod: 25 | Performed by: PHYSICIAN ASSISTANT

## 2021-11-01 PROCEDURE — G0103 PSA SCREENING: HCPCS | Performed by: PHYSICIAN ASSISTANT

## 2021-11-01 PROCEDURE — 99213 OFFICE O/P EST LOW 20 MIN: CPT | Mod: 25 | Performed by: PHYSICIAN ASSISTANT

## 2021-11-01 PROCEDURE — 83036 HEMOGLOBIN GLYCOSYLATED A1C: CPT | Performed by: PHYSICIAN ASSISTANT

## 2021-11-01 PROCEDURE — 90471 IMMUNIZATION ADMIN: CPT | Performed by: PHYSICIAN ASSISTANT

## 2021-11-01 PROCEDURE — 80053 COMPREHEN METABOLIC PANEL: CPT | Performed by: PHYSICIAN ASSISTANT

## 2021-11-01 PROCEDURE — 17110 DESTRUCTION B9 LES UP TO 14: CPT | Performed by: PHYSICIAN ASSISTANT

## 2021-11-01 ASSESSMENT — ENCOUNTER SYMPTOMS
NAUSEA: 0
SHORTNESS OF BREATH: 0
HEMATURIA: 0
HEADACHES: 0
DYSURIA: 0
SORE THROAT: 0
JOINT SWELLING: 0
FEVER: 0
HEMATOCHEZIA: 0
ABDOMINAL PAIN: 0
NERVOUS/ANXIOUS: 0
MYALGIAS: 0
FREQUENCY: 0
WEAKNESS: 0
DIARRHEA: 0
CHILLS: 0
EYE PAIN: 0
DIZZINESS: 0
ARTHRALGIAS: 0
PARESTHESIAS: 0
COUGH: 0
HEARTBURN: 0
PALPITATIONS: 0
CONSTIPATION: 0

## 2021-11-01 ASSESSMENT — MIFFLIN-ST. JEOR: SCORE: 1996.84

## 2021-11-01 ASSESSMENT — PAIN SCALES - GENERAL: PAINLEVEL: NO PAIN (0)

## 2021-11-01 NOTE — TELEPHONE ENCOUNTER
Patient returned call.  I read JOSH Huynh's note/instructions to him as per below and reviewed his lab results with him.  I gave him the scheduling number to schedule his urology appointment/consult for the elevated PSA.  He states will call and get that scheduled.  Aware needs lab only appointment in 3 months for his alt/ast (Liver enzyme tests).  He states will check his schedule and make the appointment.  Claudia Murphy RN

## 2021-11-01 NOTE — RESULT ENCOUNTER NOTE
PLEASE CALL PATIENT:  Dear Petar,      It was a pleasure to see you at your recent office visit.  Your test results are listed below.    Psa (prostate screening) test is elevated significantly at 12. This can be seen with enlarged prsostate but also with cancer so urology would be who you need to see next for that. Referral placed.     Blood sugar elevated but not in diabetic range. A1c shows pre-diabetes putting you at risk of developing diabetes over the next few years if you do not change lifestyle.  Would work on eating less sugar/carbs and losing weight and recheck 1 year for that. Same for elevated cholesterol. It is not to the point where you needs cholesterol medication.     Alt (liver enzyme) is elevated but AST normal.  We should recheck that in 3 months.         If you have any questions or concerns, please call the clinic at [phone].    Sincerely,  Stacie BRADLEY

## 2021-11-01 NOTE — PROGRESS NOTES
SUBJECTIVE:   CC: Petar Chapman is an 51 year old male who presents for preventative health visit.     Patient has been advised of split billing requirements and indicates understanding: Yes  Healthy Habits:     Getting at least 3 servings of Calcium per day:  Yes    Bi-annual eye exam:  NO    Dental care twice a year:  Yes    Sleep apnea or symptoms of sleep apnea:  Daytime drowsiness and Excessive snoring    Diet:  Carbohydrate counting    Frequency of exercise:  None    Taking medications regularly:  Not Applicable    Medication side effects:  Not applicable    PHQ-2 Total Score: 0    Additional concerns today:  No    R neck skin tag-gets pulled on clothing or causes pain/bleeding with shaving. Would like removed.       bmi 36-not working on losing weight currently. Eats out 3 days/week because travels for work. His goal is to eat out less.     SH--works in sales.     Dad with MI.     Blood pressure runs in the 80s diastolic.     Wife notices apnea at night. Does snore. Needs new referral for sleep study.     Today's PHQ-2 Score:   PHQ-2 ( 1999 [employer]) 11/1/2021   Q1: Little interest or pleasure in doing things 0   Q2: Feeling down, depressed or hopeless 0   PHQ-2 Score 0   Q1: Little interest or pleasure in doing things Not at all   Q2: Feeling down, depressed or hopeless Not at all   PHQ-2 Score 0       Abuse: Current or Past(Physical, Sexual or Emotional)- No  Do you feel safe in your environment? Yes        Social History     Tobacco Use     Smoking status: Former Smoker     Smokeless tobacco: Former User     Quit date: 3/15/1995     Tobacco comment: snuff   2010   Substance Use Topics     Alcohol use: Yes     Comment: OCC       Alcohol Use 11/1/2021   Prescreen: >3 drinks/day or >7 drinks/week? Yes   Prescreen: >3 drinks/day or >7 drinks/week? -   AUDIT SCORE  7       Last PSA:   PSA   Date Value Ref Range Status   05/22/2012 3.78 0 - 4 ug/L Final       Reviewed orders with patient. Reviewed health  maintenance and updated orders accordingly - Yes  Lab work is in process  Labs reviewed in EPIC  BP Readings from Last 3 Encounters:   11/01/21 128/89   06/18/20 120/88   07/03/19 129/81    Wt Readings from Last 3 Encounters:   11/01/21 113.9 kg (251 lb)   06/18/20 111.1 kg (245 lb)   07/03/19 108.4 kg (239 lb)                  Patient Active Problem List   Diagnosis     CARDIOVASCULAR SCREENING; LDL GOAL LESS THAN 160     Hearing loss     Family history of early CAD     Obesity     Shoulder pain, left     Past Surgical History:   Procedure Laterality Date     ORTHOPEDIC SURGERY  1982    right leg       Social History     Tobacco Use     Smoking status: Former Smoker     Smokeless tobacco: Former User     Quit date: 3/15/1995     Tobacco comment: snuff   2010   Substance Use Topics     Alcohol use: Yes     Comment: OCC     Family History   Problem Relation Age of Onset     Heart Disease Father 32        mi non-smoker     Cancer Maternal Grandmother      Heart Disease Paternal Grandmother          No current outpatient medications on file.     Allergies   Allergen Reactions     Asa [Aspirin] Swelling     Ibuprofen Swelling     Face edematous with aspirin       Reviewed and updated as needed this visit by clinical staff  Tobacco                Reviewed and updated as needed this visit by Provider                No past medical history on file.   Past Surgical History:   Procedure Laterality Date     ORTHOPEDIC SURGERY  1982    right leg       Review of Systems   Constitutional: Negative for chills and fever.   HENT: Positive for hearing loss. Negative for congestion, ear pain and sore throat.    Eyes: Negative for pain and visual disturbance.   Respiratory: Negative for cough and shortness of breath.    Cardiovascular: Negative for chest pain, palpitations and peripheral edema.   Gastrointestinal: Negative for abdominal pain, constipation, diarrhea, heartburn, hematochezia and nausea.   Genitourinary: Negative for  "discharge, dysuria, frequency, genital sores, hematuria, impotence and urgency.   Musculoskeletal: Negative for arthralgias, joint swelling and myalgias.   Skin: Negative for rash.   Neurological: Negative for dizziness, weakness, headaches and paresthesias.   Psychiatric/Behavioral: Negative for mood changes. The patient is not nervous/anxious.          OBJECTIVE:   /89   Pulse 78   Temp (!) 96.4  F (35.8  C)   Ht 1.765 m (5' 9.5\")   Wt 113.9 kg (251 lb)   SpO2 97%   BMI 36.53 kg/m      Physical Exam  GENERAL: alert, no distress and obese  EYES: Eyes grossly normal to inspection, PERRL and conjunctivae and sclerae normal  HENT: ear canals and TM's normal, nose and mouth without ulcers or lesions  NECK: no adenopathy, no asymmetry, masses, or scars and thyroid normal to palpation  RESP: lungs clear to auscultation - no rales, rhonchi or wheezes  CV: regular rate and rhythm, normal S1 S2, no S3 or S4, no murmur, click or rub, no peripheral edema and peripheral pulses strong  MS: no gross musculoskeletal defects noted, no edema  SKIN: no suspicious lesions or rashes  NEURO: Normal strength and tone, mentation intact and speech normal  PSYCH: mentation appears normal, affect normal/bright    SKIN:  Right side of neck-small skin colored-pedunculated papule with wide neck.     Procedure:lesion was  treated with liquid nitrogen cryotherapy x 3.  Patient tolerated well with minimal discomfort.  Discusses signs and symptoms to monitor for including redness, pain, or other signs of infection.        ASSESSMENT/PLAN:   (Z00.00) Routine general medical examination at a health care facility  (primary encounter diagnosis)  Comment:   Plan: INFLUENZA QUAD, RECOMBINANT, P-FREE (RIV4)         (FLUBLOK)            (R06.83) Snoring  Comment: needs sleep study to look for sleep apnea, could be contributing to high diastolic as well    Plan: SLEEP EVALUATION & MANAGEMENT REFERRAL - ADULT         -            (L91.8) Skin " "tag  Comment:   Plan: DESTRUCT BENIGN LESION, UP TO 14        F/u 2 weeks if not gone, sooner if needed    (Z13.220) Screening, lipid  Comment:   Plan: Lipid panel reflex to direct LDL Fasting            (Z13.1) Screening for diabetes mellitus  Comment:   Plan: Comprehensive metabolic panel (BMP + Alb, Alk         Phos, ALT, AST, Total. Bili, TP), Hemoglobin         A1c            (Z12.5) Screening for prostate cancer  Comment:   Plan: PSA, screen              Patient has been advised of split billing requirements and indicates understanding: Yes  COUNSELING:   Reviewed preventive health counseling, as reflected in patient instructions       Regular exercise       Healthy diet/nutrition       Vision screening       Hearing screening    Estimated body mass index is 35.66 kg/m  as calculated from the following:    Height as of this encounter: 1.765 m (5' 9.5\").    Weight as of 6/18/20: 111.1 kg (245 lb).     Weight management plan: Discussed healthy diet and exercise guidelines    He reports that he has quit smoking. He quit smokeless tobacco use about 26 years ago.  Patient Instructions   Lifestyle recommendations:  Being overweight or obese puts you are risk of major health problems including but not limited to: heart disease/heart attack, stroke, high cholesterol, high blood pressure, and diabetes.  This is why it is important to be at a healthy weight for your height.     Exercise 30 minutes 3-5 times a week, if you can only do 10 minutes 3 times a week that is still shown to have great benefit!  Brisk walking even counts for this.  Consider free youtube videos for exercise that fits your needs and lifestyle.     Monitor your caffeine and soda intake, try to minimize these beverages    Drink plenty of water (about 70-80 ounces a day)    Try to eat a vegetable and fruit  with lunch and dinner.  Have a breakfast that contains protein such as eggs or oatmeal.  Decrease your white bread, pasta, and sweets intake.  " Increase lean proteins like chicken or pork. Try to eat out 1-2 times a week or less.  Monitor your portion sizes, try using smaller plates if needed.  Eat slowly, this gives you time to be aware that your body is full.   Let me know at any time if you would like a referral to a nutritionist!              Counseling Resources:  ATP IV Guidelines  Pooled Cohorts Equation Calculator  FRAX Risk Assessment  ICSI Preventive Guidelines  Dietary Guidelines for Americans, 2010  USDA's MyPlate  ASA Prophylaxis  Lung CA Screening    Stacie Arriaza PA-C  [facility]

## 2021-11-01 NOTE — TELEPHONE ENCOUNTER
Denise Murphy RN   11/1/2021  3:43 PM CDT Back to Top        Left message on answering machine for patient to call back clinic RN at [phone].  Stacie Marie RN, PA-C   11/1/2021  2:49 PM CDT         PLEASE CALL PATIENT:  Dear Petar,      It was a pleasure to see you at your recent office visit.  Your test results are listed below.    Psa (prostate screening) test is elevated significantly at 12. This can be seen with enlarged prsostate but also with cancer so urology would be who you need to see next for that. Referral placed.      Blood sugar elevated but not in diabetic range. A1c shows pre-diabetes putting you at risk of developing diabetes over the next few years if you do not change lifestyle.  Would work on eating less sugar/carbs and losing weight and recheck 1 year for that. Same for elevated cholesterol. It is not to the point where you needs cholesterol medication.      Alt (liver enzyme) is elevated but AST normal.  We should recheck that in 3 months.          If you have any questions or concerns, please call the clinic at [phone].     Sincerely,  Stacie Arriaza PA-C

## 2021-11-12 ENCOUNTER — VIRTUAL VISIT (OUTPATIENT)
Dept: UROLOGY | Facility: CLINIC | Age: 51
End: 2021-11-12
Payer: COMMERCIAL

## 2021-11-12 DIAGNOSIS — R97.20 ELEVATED PROSTATE SPECIFIC ANTIGEN (PSA): Primary | ICD-10-CM

## 2021-11-12 PROCEDURE — 99203 OFFICE O/P NEW LOW 30 MIN: CPT | Mod: 95 | Performed by: PHYSICIAN ASSISTANT

## 2021-11-12 NOTE — PROGRESS NOTES
Petar is a 51 year old who is being evaluated via a billable video visit.      How would you like to obtain your AVS? Mail a copy  If the video visit is dropped, the invitation should be resent by: Text to cell phone: [phone]  Will anyone else be joining your video visit? No      Video Start Time:   Video-Visit Details    Type of service:  Video Visit    Video End Time:    Originating Location (pt. Location):     Distant Location (provider location):  [facility]     Platform used for Video Visit:   Dinesh Berkowitz CMA

## 2021-11-12 NOTE — PROGRESS NOTES
Name: Petar Chapman    MRN: [id number]   YOB: 1970                 Chief Complaint:   Elevated PSA         Assessment and Plan:   51 year old male with elevated PSA. We discussed the possible causes for elevated PSA and the utility of PSA as a screening tool for prostate cancer. Also discussed further evaluation to include repeat PSA, prostate MRI, possible prostate biopsy. Plan for the following:  -Repeat PSA in 2-3 weeks to confirm if still significantly elevated.  -If PSA remains elevated, plan for prostate MRI +/- TRUS/biopsy.      Phyllis Grimaldo PA-C  November 12, 2021          History of Present Illness:   Petar Chapman is a 51 year old male seen today via virtual visit in consultation from Stacie Hansen PA-C for evaluation of elevated PSA. His PSA trend has been as follows:     Lab Results   Component Value Date    PSA 12.00 11/01/2021    PSA 3.78 05/22/2012       No known family history of prostate cancer.  No personal history of BPH or other prostate issues.  Denies significant LUTS at baseline. Maybe some occasional slowing of the stream, but not overly bothersome.  No dysuria, gross hematuria, UTIs, prostatitis.         Past Medical History:   No past medical history on file.         Past Surgical History:     Past Surgical History:   Procedure Laterality Date     ORTHOPEDIC SURGERY  1982    right leg            Social History:     Social History     Tobacco Use     Smoking status: Former Smoker     Smokeless tobacco: Former User     Quit date: 3/15/1995     Tobacco comment: snuff   2010   Substance Use Topics     Alcohol use: Yes     Comment: OCC            Family History:     Family History   Problem Relation Age of Onset     Heart Disease Father 32        mi non-smoker     Cancer Maternal Grandmother      Heart Disease Paternal Grandmother             Allergies:     Allergies   Allergen Reactions     Asa [Aspirin] Swelling     Ibuprofen Swelling     Face edematous with  aspirin            Medications:     No current outpatient medications on file.     No current facility-administered medications for this visit.             Review of Systems:    ROS: 14 point ROS neg other than the symptoms noted above in the HPI and PMH.          Physical Exam:   GENERAL: Healthy, alert and no distress  EYES: Eyes grossly normal to inspection.  No discharge or erythema, or obvious scleral/conjunctival abnormalities.  RESP: No audible wheeze, cough, or visible cyanosis.  No visible retractions or increased work of breathing.    SKIN: Visible skin clear. No significant rash, abnormal pigmentation or lesions.  NEURO: Cranial nerves grossly intact.  Mentation and speech appropriate for age.  PSYCH: Mentation appears normal, affect normal/bright, judgement and insight intact, normal speech and appearance well-groomed.       Labs:      Lab Results   Component Value Date    PSA 12.00 11/01/2021    PSA 3.78 05/22/2012           Imaging:    No recent  imaging.        Video Start Time: 3:06 PM  Video-Visit Details    Type of service:  Video Visit    Video End Time: 3:22 PM    Originating Location (pt. Location): Home    Distant Location (provider location):  [facility]     Platform used for Video Visit: [employer]      33 minutes spent on the date of the encounter doing chart review, review of test results, interpretation of tests, patient visit and documentation

## 2021-11-12 NOTE — PATIENT INSTRUCTIONS
UROLOGY CLINIC VISIT PATIENT INSTRUCTIONS    Go to the [facility] (or any [facility] clinic) to have your PSA blood test repeated in 2-3 weeks.    If the PSA remains elevated, we will plan to schedule you for a prostate MRI.    If you have any issues, questions or concerns in the meantime, do not hesitate to contact us at [phone] or via [employer].     It was a pleasure meeting with you today.  Thank you for allowing me and my team the privilege of caring for you today.  YOU are the reason we are here, and I truly hope we provided you with the excellent service you deserve.  Please let us know if there is anything else we can do for you so that we can be sure you are leaving completely satisfied with your care experience.

## 2021-11-29 ENCOUNTER — LAB (OUTPATIENT)
Dept: LAB | Facility: CLINIC | Age: 51
End: 2021-11-29
Payer: COMMERCIAL

## 2021-11-29 DIAGNOSIS — R97.20 ELEVATED PROSTATE SPECIFIC ANTIGEN (PSA): ICD-10-CM

## 2021-11-29 LAB — PSA SERPL-MCNC: 11.2 UG/L (ref 0–4)

## 2021-11-29 PROCEDURE — 36415 COLL VENOUS BLD VENIPUNCTURE: CPT

## 2021-11-29 PROCEDURE — 84153 ASSAY OF PSA TOTAL: CPT

## 2021-11-30 ENCOUNTER — TELEPHONE (OUTPATIENT)
Dept: UROLOGY | Facility: CLINIC | Age: 51
End: 2021-11-30
Payer: COMMERCIAL

## 2021-11-30 DIAGNOSIS — R97.20 ELEVATED PROSTATE SPECIFIC ANTIGEN (PSA): Primary | ICD-10-CM

## 2021-11-30 NOTE — TELEPHONE ENCOUNTER
Phyllis Grimaldo PA-C  P [facility] Adult [place]  Please notify patient that his PSA remains elevated at 11.20. Recommend prostate MRI and follow up with Dr. Tejeda to review results as soon as possible.   Thanks,   Phyllis Grimaldo PA-C     Received the 11/29/21 PSA results and the above result note from Phyllis Grimaldo PA-C. Called and spoke to patient who is aware of the above information. Future prostate MRI ordered and sent for Phyllis Grimaldo PA-C to review and sign order. Scheduled patient for video visit with Dr. Tejeda on 12/23/21 at 3:00pm. Patient was transferred to imaging scheduling to schedule prostate MRI.    Megan Lewis RN, BSN

## 2021-12-17 ENCOUNTER — TELEPHONE (OUTPATIENT)
Dept: UROLOGY | Facility: CLINIC | Age: 51
End: 2021-12-17

## 2021-12-17 ENCOUNTER — HOSPITAL ENCOUNTER (OUTPATIENT)
Dept: MRI IMAGING | Facility: CLINIC | Age: 51
Discharge: HOME | End: 2021-12-17
Attending: PHYSICIAN ASSISTANT | Admitting: PHYSICIAN ASSISTANT
Payer: COMMERCIAL

## 2021-12-17 DIAGNOSIS — R97.20 ELEVATED PROSTATE SPECIFIC ANTIGEN (PSA): ICD-10-CM

## 2021-12-17 PROCEDURE — 255N000002 HC RX 255 OP 636: Performed by: PHYSICIAN ASSISTANT

## 2021-12-17 PROCEDURE — 72197 MRI PELVIS W/O & W/DYE: CPT

## 2021-12-17 PROCEDURE — 72197 MRI PELVIS W/O & W/DYE: CPT | Mod: 26 | Performed by: RADIOLOGY

## 2021-12-17 PROCEDURE — A9585 GADOBUTROL INJECTION: HCPCS | Performed by: PHYSICIAN ASSISTANT

## 2021-12-17 RX ORDER — GADOBUTROL 604.72 MG/ML
10 INJECTION INTRAVENOUS ONCE
Status: COMPLETED | OUTPATIENT
Start: 2021-12-17 | End: 2021-12-17

## 2021-12-17 RX ADMIN — GADOBUTROL 10 ML: 604.72 INJECTION INTRAVENOUS at 08:05

## 2021-12-17 NOTE — TELEPHONE ENCOUNTER
I attempted to call patient with MRI results. Left generic voicemail.  If he returns the call to clinic, okay to inform him that his MRI is equivocal for prostate cancer, meaning that the risk for finding prostate cancer is intermediate (not low or high risk). These results will be discussed further with him at his appointment with Dr. Tejeda next week (12/23). It's possible that a prostate biopsy will be recommended, but will defer that decision to Dr. Tejeda.   Thanks!  Phyllis Grimaldo PA-C    Spoke to pt.  Relayed result note as stated above.  Pt verbalized understanding.  No questions noted.

## 2021-12-23 ENCOUNTER — VIRTUAL VISIT (OUTPATIENT)
Dept: UROLOGY | Facility: CLINIC | Age: 51
End: 2021-12-23
Payer: COMMERCIAL

## 2021-12-23 DIAGNOSIS — R97.20 ELEVATED PROSTATE SPECIFIC ANTIGEN (PSA): Primary | ICD-10-CM

## 2021-12-23 PROCEDURE — 99214 OFFICE O/P EST MOD 30 MIN: CPT | Mod: 95 | Performed by: UROLOGY

## 2021-12-23 RX ORDER — CIPROFLOXACIN 500 MG/1
500 TABLET, FILM COATED ORAL 2 TIMES DAILY
Qty: 6 TABLET | Refills: 0 | Status: SHIPPED | OUTPATIENT
Start: 2021-12-23 | End: 2022-07-21

## 2021-12-23 NOTE — PROGRESS NOTES
[facility]  CHIEF COMPLAINT   It was my pleasure to see Petar Chapman who is a 51 year old male for follow-up of Elevated PSA.      HPI   Petar Chapman is a very pleasant 51 year old male    Initially seen by Phyllis FAIR on 11/12/21:  Petar Chapman is a 51 year old male seen today via virtual visit in consultation from Stacie Hansen PA-C for evaluation of elevated PSA.   No known family history of prostate cancer.  No personal history of BPH or other prostate issues.  Denies significant LUTS at baseline. Maybe some occasional slowing of the stream, but not overly bothersome.  No dysuria, gross hematuria, UTIs, prostatitis.    TODAY 12/23/21:  No significant urinary symptoms  No issues with the MRI  No family history of prostate cancer      PHYSICAL EXAM  Patient is a 51 year old  male   Vitals: There were no vitals taken for this visit.  There is no height or weight on file to calculate BMI.  General Appearance Adult:   Alert, no acute distress, oriented  HENT: throat/mouth:normal, good dentition  Lungs: no respiratory distress, or pursed lip breathing  Heart: No obvious jugular venous distension present  Abdomen: soft, nontender, no organomegaly or masses  Musculoskeltal: extremities normal, no peripheral edema  Skin: no suspicious lesions or rashes  Neuro: Alert, oriented, speech and mentation normal  Psych: affect and mood normal  Gait: Normal    Component PSA   Latest Ref Rng & Units 0.00 - 4.00 ug/L   5/22/2012 3.78   11/1/2021 12.00 (H)   11/29/2021 11.20 (H)     IMAGING:  All pertinent imaging reviewed:    All imaging studies reviewed by me.  I personally reviewed these imaging films.  A formal report from radiology will follow.    MRI PROSTATE 12/17/21:  FINDINGS:  Size: 54 grams  Hemorrhage: Absent  Peripheral zone: Heterogeneous on T2-weighted images. Suspicious  lesions as detailed below. Non- masslike diffuse T2 hypointense  peripheral zone with associated enhancement, likely  represent  prostatitis.  Transition zone: Enlarged with BPH changes. Transition zone nodules  which are circumscribed or mostly encapsulated without diffusion  restriction.  PI-RADS 2.  No highly suspicious nodules.     Location: Bilateral apex peripheral zone at the 6 o'clock position  relative to the urethra. Series 13 image 18.   Additional prostate regions involved: None   Size: 13 x 7 mm  T2 description: Non-circumscribed rounded hyperintensity  T2 numerical assessment: 3  DWI description: Mild high signal on DWI with mild hypointense signal  on ADC map.  DWI numerical assessment: 3  DCE assessment: Negative, though there is diffuse enhancement  throughout the region.  Prostate margin: Capsular abutment<6 mm   Lesion overall PI-RADS category: 3     Neurovascular bundles: No neurovascular bundle involvement by  malignancy.    Seminal vesicles: No seminal vesicle involvement by malignancy.   Lymph nodes: No lymph node involvement  Bones: No suspicious lesions   Other pelvic organs: Diffuse bladder wall thickening and  trabeculation.                                                 IMPRESSION:  1. Based on the most suspicious abnormality, this exam is  characterized as PIRADS 3 - The presence of clinically significant  cancer is equivocal. The most suspicious abnormality is located at the  bilateral apex peripheral zone at the 6 o'clock position and there is  minimal capsular abutment with no convincing evidence of  extraprostatic extension.  2. Non-masslike diffuse T2 hypointense peripheral zone with associated  enhancement, likely represent prostatitis.  3. No suspicious adenopathy or evidence of pelvic metastases.      ASSESSMENT and PLAN  51-year-old man with elevated PSA    Elevated PSA  -Reviewed his PSA history and trend  -His PSA from November 1 demonstrates significant elevation with only slight improvement by the end of November and still considerable elevation from his baseline PSA in 2012  -I reviewed  his MRI images personally and agree with radiologist interpretation.  We discussed the PI-RADS scoring system and that a PI-RADS 3 lesion will correlate with clinically significant prostate cancer about 50% of the time  -We discussed the need to proceed with a prostate biopsy  -TRUS/Bx - we discussed the risks and benefits of the prostate biopsy including the normal intermittent hematuria, blood per rectum, and blood with ejaculation as well as a 1-2% risk of post biopsy sepsis requiring hospitalization and IV antibiotics  -Prescription for antibiotics sent to his pharmacy  -Remains an undiagnosed new problem with an uncertain prognosis      Time spent: 20 minutes spent on the date of the encounter doing chart review, history and exam, documentation and further activities as noted above.    Petar Tejeda MD   Urology  [facility]  [facility] Phone: [phone]  [facility] Phone: [phone]      Petar Chapman  who is being evaluated via a billable video visit.      How would you like to obtain your AVS? Mail a copy  If the video visit is dropped, the invitation should be resent by: Text to cell phone: [phone]  Will anyone else be joining your video visit? Yes: Wife, Hermila. How would they like to receive their invitation? Text to cell phone: [phone]    Rebecca Keene LPN on 12/23/21 at 2:21 PM    Video-Visit Details    Type of service:  Video Visit    Video Start Time: 3:13 PM    Video End Time:3:30 PM    Originating Location (pt. Location): Home    Distant Location (provider location):  [facility]     Platform used for Video Visit: [employer]     Unremarkable

## 2022-01-03 NOTE — PATIENT INSTRUCTIONS
Ultrasound Guided Prostate Biopsy    What is a prostate biopsy? A prostate biopsy is a relatively painless procedure performed in the physician's office, outpatient facility or hospital.  A long, thin needle is inserted into the prostate to collect a sample of tissue from the prostate.  These samples are then examined by a pathologist for abnormal cells.    Why do I need a prostate biopsy? During a man's lifetime the prostate gradually increases in size.  The patient may or may not experience symptoms.  Symptoms of an enlarged prostate include:    Increased frequency of urination    Decreased force of urinary stream    Trouble with urination    Awakening at night to urinate    When the prostate is examined, the physician may feel a nodule (hard or firm growth) which would require a biopsy.    Another reason for having a prostate biopsy is an increase in the prostate specific androgen (PSA) in your blood.  A prostate biopsy may be necessary to determine the cause of the increased PSA.    Your physician will also perform an ultrasound (an image created by sound waves).  The ultrasound is performed by placing a small probe in the rectum to image the prostate gland.    Preparation for the Prostate Biopsy    1. Blood thinning medications must be stopped prior to your biopsy.  Please stop the following medication the appropriate number of days before:  a. 10 days-acetylsalicylic acid, vitamin E, fish oil, aspirin, baby aspirin  b. 7 days- Plavix, Brilinta, ibuprofen (Advil, Motrin, Aleve)  c. 5 days-Pradaxa  d. 4 days-Coumadin/warfarin  e. 3 days-Eliquis, Xarelto    2.  If you have any bleeding problems (thin blood), please tell your doctor.    3.  If you have been told by another doctor to take antibiotics before dental work or surgery, please tell the doctor.  This is common for patients that have had a joint replacement.    YOU HAVE BEEN PRESCRIBED AN ANTIBIOTIC.  You will start this medication the day before your  biopsy. It is one pill, twice a day.  You will continue taking the medication until it is gone.    The day of the biopsy you will be asked to use an enema one hour before you leave for your appointment.    PLEASE EAT YOUR REGULAR MEALS ON THE DAY OF YOUR BIOPSY.    Unless you have been prescribed a sedative (Valium or a similar drug) you will be able to drive to and from your appointment.  If you have taken a sedative you must have a ride.    AFTER THE BIOPSY    DANGER SIGNS    Small amount of blood in the urine for 10-14 days Excessive blood in the urine, stool or ejaculate  Small amount of blood in the stool for 48 hours Fever over 100 or chills  Small amount of blood in the ejaculate for up to Frequent urination or burning when you urinate   4 weeks          CALL THE DOCTOR'S OFFICE -[phone] IF YOU HAVE ANY OF THESE SYMPTOMS.  IF YOU CANNOT CONTACT THE OFFICE, GO TO THE EMERGENCY ROOM.          Your biopsy is scheduled on____1/7/22______ at our [place] office.  Please be at the office at     ____8:00AM________.      A follow up visit has been scheduled for you on____1/19/22_____@____4:30PM____ . This is a     virtual visit.  Your doctor will discuss your results with you at this visit.

## 2022-01-07 ENCOUNTER — OFFICE VISIT (OUTPATIENT)
Dept: UROLOGY | Facility: CLINIC | Age: 52
End: 2022-01-07
Payer: COMMERCIAL

## 2022-01-07 VITALS
SYSTOLIC BLOOD PRESSURE: 122 MMHG | OXYGEN SATURATION: 98 % | HEART RATE: 78 BPM | HEIGHT: 70 IN | WEIGHT: 251 LBS | DIASTOLIC BLOOD PRESSURE: 68 MMHG | BODY MASS INDEX: 35.93 KG/M2

## 2022-01-07 DIAGNOSIS — R97.20 ELEVATED PROSTATE SPECIFIC ANTIGEN (PSA): Primary | ICD-10-CM

## 2022-01-07 PROCEDURE — 55700 PR BIOPSY OF PROSTATE,NEEDLE/PUNCH: CPT | Performed by: UROLOGY

## 2022-01-07 PROCEDURE — 88341 IMHCHEM/IMCYTCHM EA ADD ANTB: CPT | Performed by: PATHOLOGY

## 2022-01-07 PROCEDURE — 76942 ECHO GUIDE FOR BIOPSY: CPT | Performed by: UROLOGY

## 2022-01-07 PROCEDURE — 88342 IMHCHEM/IMCYTCHM 1ST ANTB: CPT | Performed by: PATHOLOGY

## 2022-01-07 PROCEDURE — 96372 THER/PROPH/DIAG INJ SC/IM: CPT | Mod: 59 | Performed by: UROLOGY

## 2022-01-07 PROCEDURE — 88305 TISSUE EXAM BY PATHOLOGIST: CPT | Performed by: PATHOLOGY

## 2022-01-07 RX ORDER — LIDOCAINE HYDROCHLORIDE 10 MG/ML
20 INJECTION, SOLUTION EPIDURAL; INFILTRATION; INTRACAUDAL; PERINEURAL ONCE
Status: DISCONTINUED | OUTPATIENT
Start: 2022-01-07 | End: 2022-01-07

## 2022-01-07 RX ADMIN — GENTAMICIN 80 MG: 40 INJECTION, SOLUTION INTRAMUSCULAR; INTRAVENOUS at 08:20

## 2022-01-07 ASSESSMENT — PAIN SCALES - GENERAL: PAINLEVEL: NO PAIN (0)

## 2022-01-07 ASSESSMENT — MIFFLIN-ST. JEOR: SCORE: 1996.84

## 2022-01-07 NOTE — PATIENT INSTRUCTIONS
[facility]  Transrectal Ultrasound  Post Operative Information    The physician who performed your Transrectal Ultrasound is Dr. Tejeda (telephone number [phone]).  Please contact this doctor if you have any problems or questions.  If unable to reach your doctor, please return to the Emergency Department.      Take one antibiotic the evening of the procedure and then as directed on your prescription.    Drink at least 6-8 glasses of fluids for the first 48 hours.    Avoid heavy lifting and strenuous activity for 48 hours.    Avoid sexual intercourse for the first 24 hours.    No aspirin or ibuprofen products (Motrin, Advil, Nuprin, ect.) for one week.  You may take acetaminophen (Tylenol) for pain.    You may notice a small amount of blood on the tissue after a bowel movement.    You may pass blood with clots in your urine following the procedure.  The amount will decrease with time but may be visible for up to two weeks.     You make have blood in your semen for 4 weeks after the procedure.    You may experience mild perineal (groin area) discomfort after the procedure.    Please call you doctor if you have any of the follow symptoms:  Fever  Increase in the amount of blood passed  Severe discomfort or pain

## 2022-01-07 NOTE — LETTER
1/7/2022       RE: Petar Chapman  [address] [place] [place] [facility] [address]     Dear Colleague,    Thank you for referring your patient, Petar Chapman, to the [facility] at [facility]. Please see a copy of my visit note below.    PHYSICIANS NOTE: TRANSRECTAL ULTRASOUND AND BIOPSY PROCEDURE: 1/7/2022   CHONG    Sign In   History and Physical Exam reviewed and is unchanged.     Primary Diagnosis: Elevated psa (primary encounter diagnosis)   Prostate cancer     Informed Consent Discussed: Yes. Risks, benefits, alternatives and personnel discussed with patient who consents to proceed.     Sign in Communication: Completed   Time Out: Team Confirms the Correct Patient,   Correct Procedure; Transrectal Ultrasound and Biopsy, Correct Site and Site Marking (not applicable), Correct Position.   Affirmation of Time Out: YES   Sign Out: Sign Out Discussion: Completed   Patient history and ROS confirmed unchanged from last office visit.   Family history for prostate cancer is: unknown   Audible Time Out: Yes     TRUS PROCEDURE WITH BIOPSY     The patient was placed in the lateral decubitus position.     Digital rectal exam was normal.     The ultrasound probe was placed into the rectum and the prostate visualized.   Approximately five cc plain lidocaine was injected bilaterally into the perioprostatic nerves.     The prostate was visualized in both planes and a hypoechoic lesion identified corresponding to the MRI target.     The total prostate volume was 53.5 gm     The patient underwent biopsy removing 15 total cores. 3 cores from the MRI target lesion and 12 standard template cores    PSA   Date Value Ref Range Status   05/22/2012 3.78 0 - 4 ug/L Final     Prostate Specific Antigen Screen   Date Value Ref Range Status   11/01/2021 12.00 (H) 0.00 - 4.00 ug/L Final     PSA Tumor Marker   Date Value Ref Range Status   11/29/2021 11.20 (H)  0.00 - 4.00 ug/L Final       MRI PROSTATE:  FINDINGS:  Size: 54 grams  Hemorrhage: Absent  Peripheral zone: Heterogeneous on T2-weighted images. Suspicious  lesions as detailed below. Non- masslike diffuse T2 hypointense  peripheral zone with associated enhancement, likely represent  prostatitis.  Transition zone: Enlarged with BPH changes. Transition zone nodules  which are circumscribed or mostly encapsulated without diffusion  restriction.  PI-RADS 2.  No highly suspicious nodules.     Location: Bilateral apex peripheral zone at the 6 o'clock position  relative to the urethra. Series 13 image 18.   Additional prostate regions involved: None   Size: 13 x 7 mm  T2 description: Non-circumscribed rounded hyperintensity  T2 numerical assessment: 3  DWI description: Mild high signal on DWI with mild hypointense signal  on ADC map.  DWI numerical assessment: 3  DCE assessment: Negative, though there is diffuse enhancement  throughout the region.  Prostate margin: Capsular abutment<6 mm   Lesion overall PI-RADS category: 3     Neurovascular bundles: No neurovascular bundle involvement by  malignancy.    Seminal vesicles: No seminal vesicle involvement by malignancy.   Lymph nodes: No lymph node involvement  Bones: No suspicious lesions   Other pelvic organs: Diffuse bladder wall thickening and  trabeculation.                                                                      IMPRESSION:  1. Based on the most suspicious abnormality, this exam is  characterized as PIRADS 3 - The presence of clinically significant  cancer is equivocal. The most suspicious abnormality is located at the  bilateral apex peripheral zone at the 6 o'clock position and there is  minimal capsular abutment with no convincing evidence of  extraprostatic extension.  2. Non-masslike diffuse T2 hypointense peripheral zone with associated  enhancement, likely represent prostatitis.  3. No suspicious adenopathy or evidence of pelvic  metastases.  ----------     Complications: None     Follow-up: He is not on MyChart and would not like to receive results before our scheduled visit. Follow up as scheduled.    Petar Tejeda MD

## 2022-01-07 NOTE — NURSING NOTE
Chief Complaint   Patient presents with     Elevated PSA     Patient is here for Transrectal Ultrasound/MRI Guided Prostate Biopsies        Procedure was explained to patient prior to performing said procedure.  The patient signed the [place] consent form and all questions were answered prior to the procedure.  Any pre-procedural antibiotics were given according to the performing physician's recommendation.  Patient reviewed information on the labels attached to samples and confirmed the accuracy of all of the labels.     Performing Physician:  Dr. Tejeda  Antibiotic taken?  yes  Aspirin or other blood thinning medications discontinued 7-10 days:  yes  Time of enema:  6:30am  Patient given Gentamicin intramuscular injection 30 minutes prior to procedure  Yes    Jessy Hamm LPN

## 2022-01-07 NOTE — PROGRESS NOTES
PHYSICIANS NOTE: TRANSRECTAL ULTRASOUND AND BIOPSY PROCEDURE: 1/7/2022   CHONG    Sign In   History and Physical Exam reviewed and is unchanged.     Primary Diagnosis: Elevated psa (primary encounter diagnosis)   Prostate cancer     Informed Consent Discussed: Yes. Risks, benefits, alternatives and personnel discussed with patient who consents to proceed.     Sign in Communication: Completed   Time Out: Team Confirms the Correct Patient,   Correct Procedure; Transrectal Ultrasound and Biopsy, Correct Site and Site Marking (not applicable), Correct Position.   Affirmation of Time Out: YES   Sign Out: Sign Out Discussion: Completed   Patient history and ROS confirmed unchanged from last office visit.   Family history for prostate cancer is: unknown   Audible Time Out: Yes     TRUS PROCEDURE WITH BIOPSY     The patient was placed in the lateral decubitus position.     Digital rectal exam was normal.     The ultrasound probe was placed into the rectum and the prostate visualized.   Approximately five cc plain lidocaine was injected bilaterally into the perioprostatic nerves.     The prostate was visualized in both planes and a hypoechoic lesion identified corresponding to the MRI target.     The total prostate volume was 53.5 gm     The patient underwent biopsy removing 15 total cores. 3 cores from the MRI target lesion and 12 standard template cores    PSA   Date Value Ref Range Status   05/22/2012 3.78 0 - 4 ug/L Final     Prostate Specific Antigen Screen   Date Value Ref Range Status   11/01/2021 12.00 (H) 0.00 - 4.00 ug/L Final     PSA Tumor Marker   Date Value Ref Range Status   11/29/2021 11.20 (H) 0.00 - 4.00 ug/L Final       MRI PROSTATE:  FINDINGS:  Size: 54 grams  Hemorrhage: Absent  Peripheral zone: Heterogeneous on T2-weighted images. Suspicious  lesions as detailed below. Non- masslike diffuse T2 hypointense  peripheral zone with associated enhancement, likely represent  prostatitis.  Transition zone:  Enlarged with BPH changes. Transition zone nodules  which are circumscribed or mostly encapsulated without diffusion  restriction.  PI-RADS 2.  No highly suspicious nodules.     Location: Bilateral apex peripheral zone at the 6 o'clock position  relative to the urethra. Series 13 image 18.   Additional prostate regions involved: None   Size: 13 x 7 mm  T2 description: Non-circumscribed rounded hyperintensity  T2 numerical assessment: 3  DWI description: Mild high signal on DWI with mild hypointense signal  on ADC map.  DWI numerical assessment: 3  DCE assessment: Negative, though there is diffuse enhancement  throughout the region.  Prostate margin: Capsular abutment<6 mm   Lesion overall PI-RADS category: 3     Neurovascular bundles: No neurovascular bundle involvement by  malignancy.    Seminal vesicles: No seminal vesicle involvement by malignancy.   Lymph nodes: No lymph node involvement  Bones: No suspicious lesions   Other pelvic organs: Diffuse bladder wall thickening and  trabeculation.                                                                      IMPRESSION:  1. Based on the most suspicious abnormality, this exam is  characterized as PIRADS 3 - The presence of clinically significant  cancer is equivocal. The most suspicious abnormality is located at the  bilateral apex peripheral zone at the 6 o'clock position and there is  minimal capsular abutment with no convincing evidence of  extraprostatic extension.  2. Non-masslike diffuse T2 hypointense peripheral zone with associated  enhancement, likely represent prostatitis.  3. No suspicious adenopathy or evidence of pelvic metastases.  ----------     Complications: None     Follow-up: He is not on MyChart and would not like to receive results before our scheduled visit. Follow up as scheduled.    Petar Tejeda MD

## 2022-01-07 NOTE — NURSING NOTE
"Chief Complaint   Patient presents with     Elevated PSA     Patient is here for Transrectal Ultrasound/MRI Guided Prostate Biopsies        Blood pressure 122/68, pulse 78, height 1.765 m (5' 9.5\"), weight 113.9 kg (251 lb), SpO2 98 %. Body mass index is 36.53 kg/m .    Patient Active Problem List   Diagnosis     CARDIOVASCULAR SCREENING; LDL GOAL LESS THAN 160     Hearing loss     Family history of early CAD     Obesity     Shoulder pain, left       Allergies   Allergen Reactions     Asa [Aspirin] Swelling     Ibuprofen Swelling     Face edematous with aspirin       Current Outpatient Medications   Medication Sig Dispense Refill     ciprofloxacin (CIPRO) 500 MG tablet Take 1 tablet (500 mg) by mouth 2 times daily Start taking the day before your prostate biopsy. 6 tablet 0       Social History     Tobacco Use     Smoking status: Former Smoker     Smokeless tobacco: Former User     Quit date: 3/15/1995     Tobacco comment: snuff   2010   Substance Use Topics     Alcohol use: Yes     Comment: [facility]     Drug use: No       MILENA Marks  1/7/2022  10:52 AM       "

## 2022-01-11 LAB
PATH REPORT.COMMENTS IMP SPEC: ABNORMAL
PATH REPORT.COMMENTS IMP SPEC: ABNORMAL
PATH REPORT.COMMENTS IMP SPEC: YES
PATH REPORT.FINAL DX SPEC: ABNORMAL
PATH REPORT.GROSS SPEC: ABNORMAL
PATH REPORT.MICROSCOPIC SPEC OTHER STN: ABNORMAL
PATH REPORT.RELEVANT HX SPEC: ABNORMAL
PHOTO IMAGE: ABNORMAL

## 2022-01-12 RX ORDER — GENTAMICIN 40 MG/ML
80 INJECTION, SOLUTION INTRAMUSCULAR; INTRAVENOUS ONCE
Status: COMPLETED | OUTPATIENT
Start: 2022-01-07 | End: 2022-01-07

## 2022-01-12 NOTE — NURSING NOTE
The following medication was given:     MEDICATION: Gentamicin   ROUTE: IM  SITE: LUQ - Gluteus  DOSE:80mg/2ml  LOT #: [id number]  : [employer]  EXPIRATION DATE: 02/23  NDC#:  38520-240-30  Was there drug waste? No  Multi-dose vial: Yes  Using a 1 1/2 inch 18 guage needle medication drawn up as ordered with sterile syringe. Using sterile technique, a new 1 1/2 needle 18 gauge placed on syringe and patient cleansed with alcohol pad. Using a 90 degree angle dart method and after aspiration of needle, patient was given IM injection. Patient tolerated injection well, and bandage placed on site following insertion removal.   Jessy Hamm LPN

## 2022-01-19 ENCOUNTER — VIRTUAL VISIT (OUTPATIENT)
Dept: UROLOGY | Facility: CLINIC | Age: 52
End: 2022-01-19
Payer: COMMERCIAL

## 2022-01-19 DIAGNOSIS — N52.9 ERECTILE DYSFUNCTION, UNSPECIFIED ERECTILE DYSFUNCTION TYPE: ICD-10-CM

## 2022-01-19 DIAGNOSIS — C61 PROSTATE CANCER (H): Primary | ICD-10-CM

## 2022-01-19 PROCEDURE — 99214 OFFICE O/P EST MOD 30 MIN: CPT | Mod: 95 | Performed by: UROLOGY

## 2022-01-19 NOTE — PROGRESS NOTES
[facility]  CHIEF COMPLAINT   It was my pleasure to see Petar Chapman who is a 51 year old male for follow-up of Elevated PSA.      HPI   Petar Chapman is a very pleasant 51 year old male    Initially seen by Phyllis FAIR on 11/12/21:  Petar Chapman is a 51 year old male seen today via virtual visit in consultation from Stacie Hansen PA-C for evaluation of elevated PSA.   No known family history of prostate cancer.  No personal history of BPH or other prostate issues.  Denies significant LUTS at baseline. Maybe some occasional slowing of the stream, but not overly bothersome.  No dysuria, gross hematuria, UTIs, prostatitis.    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  There is no height or weight on file to calculate BMI.  General Appearance Adult:   Alert, no acute distress, oriented  HENT: throat/mouth:normal, good dentition  Lungs: no respiratory distress, or pursed lip breathing  Heart: No obvious jugular venous distension present  Abdomen: obesely - distended  Musculoskeltal: extremities normal, no peripheral edema  Skin: no suspicious lesions or rashes  Neuro: Alert, oriented, speech and mentation normal  Psych: affect and mood normal    Component PSA   Latest Ref Rng & Units 0.00 - 4.00 ug/L   5/22/2012 3.78   11/1/2021 12.00 (H)   11/29/2021 11.20 (H)     Final Diagnosis   A: Prostate, MRI lesion x3, needle core biopsy:  - Adenocarcinoma, acinar type, grade group 2 ([place] score 3 + 4 = 7 of 10).  - Tumor involves 3 of 3 tissue  fragment(s) and 12 mm of 36 mm of biopsy tissue (approximately 33% of biopsy tissue).  - Proportion of Jerald pattern 4: 20%.  - Perineural invasion identified.     B: Prostate, left lateral base, needle core biopsy:  - Negative for malignancy.     C: Prostate, left lateral mid, needle core biopsy:  - Adenocarcinoma, acinar type, grade group 1 ([place] score 3 + 3 = 6 of 10).  - Tumor involves 1 of 1 tissue fragment(s) and 1 mm of 11 mm of biopsy tissue (approximately 9% of biopsy tissue).     D: Prostate, left lateral apex, needle core biopsy:  - Negative for malignancy.  - Chronic inflammation present.     E: Prostate, left base, needle core biopsy:  - Negative for malignancy.     F: Prostate, left mid, needle core biopsy:  - Negative for malignancy.     G: Prostate, left apex, needle core biopsy:  - Adenocarcinoma, acinar type, grade group 1 ([place] score 3 + 3 = 6 of 10).  - Tumor involves 1 of 2 tissue fragment(s) and 1 mm of 10 mm of biopsy tissue (approximately 10% of biopsy tissue).     H: Prostate, right base, needle core biopsy:  - Negative for malignancy.     I: Prostate, right mid, needle core biopsy:  - Negative for malignancy.     J: Prostate, right apex, needle core biopsy:  - Negative for malignancy.  - Chronic inflammation present.     K: Prostate, right lateral base, needle core biopsy:  - Adenocarcinoma, acinar type, grade group 1 ([place] score 3 + 3 = 6 of 10).  - Tumor involves 1 of 1 tissue fragment(s) and 1 mm of 13 mm of biopsy tissue (approximately 8% of biopsy tissue).     L: Prostate, right lateral mid, needle core biopsy:  - Negative for malignancy.     M: Prostate, right lateral apex, needle core biopsy:  - Negative for malignancy.         IMAGING:  All pertinent imaging reviewed:    All imaging studies reviewed by me.  I personally reviewed these imaging films.  A formal report from radiology will follow.    MRI PROSTATE 12/17/21:  FINDINGS:  Size: 54 grams  Hemorrhage:  Absent  Peripheral zone: Heterogeneous on T2-weighted images. Suspicious  lesions as detailed below. Non- masslike diffuse T2 hypointense  peripheral zone with associated enhancement, likely represent  prostatitis.  Transition zone: Enlarged with BPH changes. Transition zone nodules  which are circumscribed or mostly encapsulated without diffusion  restriction.  PI-RADS 2.  No highly suspicious nodules.     Location: Bilateral apex peripheral zone at the 6 o'clock position  relative to the urethra. Series 13 image 18.   Additional prostate regions involved: None   Size: 13 x 7 mm  T2 description: Non-circumscribed rounded hyperintensity  T2 numerical assessment: 3  DWI description: Mild high signal on DWI with mild hypointense signal  on ADC map.  DWI numerical assessment: 3  DCE assessment: Negative, though there is diffuse enhancement  throughout the region.  Prostate margin: Capsular abutment<6 mm   Lesion overall PI-RADS category: 3     Neurovascular bundles: No neurovascular bundle involvement by  malignancy.    Seminal vesicles: No seminal vesicle involvement by malignancy.   Lymph nodes: No lymph node involvement  Bones: No suspicious lesions   Other pelvic organs: Diffuse bladder wall thickening and  trabeculation.                                                 IMPRESSION:  1. Based on the most suspicious abnormality, this exam is  characterized as PIRADS 3 - The presence of clinically significant  cancer is equivocal. The most suspicious abnormality is located at the  bilateral apex peripheral zone at the 6 o'clock position and there is  minimal capsular abutment with no convincing evidence of  extraprostatic extension.  2. Non-masslike diffuse T2 hypointense peripheral zone with associated  enhancement, likely represent prostatitis.  3. No suspicious adenopathy or evidence of pelvic metastases.      ASSESSMENT and PLAN  51-year-old man with elevated PSA to 11.2 with an MRI showing a PI-RADS 3 lesion at  the bilateral apex now status post an MR fusion biopsy with [place] 3+4 = 7 prostate cancer in 3/3 of the target biopsy cores and Jerald 3+3 = 6 prostate cancer in 3/12 template cores    DISCUSSION PROSTATE CANCER     The natural history of this disease was explained to the patient at length and the treatment options discussed including radical prostatectomy by open or robotic-assisted laparoscopic approach, external-beam radiotherapy, brachytherapy, active surveillance and watchful waiting, including the probability of success and complications associated with these approaches.    With respect to watchful waiting, we discussed the difficulties of estimating the extent of disease preoperatively, the risk of cancer progression, and risk that salvage might not be possible with progression. However, the favorable 10-year outcomes of active surveillance in appropriately selected patients was conveyed.  We discussed that given his PSA elevation I would not recommend active surveillance for him.    With radiation therapy, we discussed the difficulties in diagnosing recurrent disease at an early stage due to variability in PSA levels and the fact that most patients are not candidates for local salvage therapy when biochemical recurrence is declared.  We also discussed the significant morbidity of local salvage therapy in terms of perioperative complications, erectile dysfunction and urinary incontinence.  We discussed that given his young age I would not recommend primary radiation therapy.    With respect to radical prostatectomy, the pros and cons of open vs robotic-assisted laparoscopic prostatectomy were discussed. The complications of this procedure were reviewed with the patient and include (but not limited to) urinary incontinence, erectile dysfunction, infertility, anastomotic stricture, lymphocele, hemorrhage requiring transfusion, rectal, ureteral, or nerve injury, infection, cardiovascular, pulmonary,  thromboembolic, and anesthetic complications.  For robotic prostatectomy, the additional complications of anastomotic urine leak and small bowel obstruction from adhesions was conveyed. The anticipated post-operative course was explained, including an anticipated 1 night hospital stay.  We discussed the plan would be for bilateral nerve sparing, though the priority always is cancer removal.    With surgery, we also discussed the potential advantage over radiation therapy in that biochemical recurrence can be detected at a relatively earlier stage and that salvage radiotherapy is successful in controlling recurrent disease in a substantial proportion of patients.  I also conveyed that salvage radiotherapy was associated with a considerably more favorable morbidity profile compared to local salvage therapies for radiorecurent disease.     All questions were answered.     Patient has decided he would like to proceed with Robotic Radical Prostatectomy - 83151    The risks, benefits, alternatives, and personnel involved in the procudure were discussed.  All questions were answered.  A written informed consent will be finalized on the morning of the procedure.    Plan:  -Given his PSA greater than 10 we discussed the need for a nuclear medicine bone scan in order for this is placed  - Orders for surgery placed    Petar Tejeda MD        Time spent: 20 minutes spent on the date of the encounter doing chart review, history and exam, documentation and further activities as noted above.    Petar Tejeda MD   Urology  [facility]  [facility] Phone: [phone]  [facility] Phone: [phone]      Petar Chapman  who is being evaluated via a billable video visit.      How would you like to obtain your AVS? MyChart  If the video visit is dropped, the invitation should be resent by: Text to cell phone: See chart  Will anyone else be joining your  video visit? No    Video-Visit Details    Type of service:  Video Visit    Video Start Time: 4:36 PM    Video End Time:5:04 PM    Originating Location (pt. Location): Home    Distant Location (provider location):  [facility]     Platform used for Video Visit: [facility]

## 2022-01-19 NOTE — LETTER
1/19/2022       RE: Petar Chapman  [address] [facility] [address]     Dear Colleague,    Thank you for referring your patient, Petar Chapman, to the [facility]. Please see a copy of my visit note below.    [facility]  CHIEF COMPLAINT   It was my pleasure to see Petar Chapman who is a 51 year old male for follow-up of Elevated PSA.      HPI   Petar Chapman is a very pleasant 51 year old male    Initially seen by Phyllis FAIR on 11/12/21:  Petar Chapman is a 51 year old male seen today via virtual visit in consultation from Stacie Hansen PA-C for evaluation of elevated PSA.   No known family history of prostate cancer.  No personal history of BPH or other prostate issues.  Denies significant LUTS at baseline. Maybe some occasional slowing of the stream, but not overly bothersome.  No dysuria, gross hematuria, UTIs, prostatitis.    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  There is no height or weight on file to calculate BMI.  General Appearance Adult:   Alert, no acute distress, oriented  HENT: throat/mouth:normal, good dentition  Lungs: no respiratory distress, or pursed lip breathing  Heart: No obvious jugular venous distension present  Abdomen: obesely - distended  Musculoskeltal: extremities normal, no peripheral edema  Skin: no suspicious lesions or rashes  Neuro: Alert, oriented, speech and mentation normal  Psych: affect and  mood normal    Component PSA   Latest Ref Rng & Units 0.00 - 4.00 ug/L   5/22/2012 3.78   11/1/2021 12.00 (H)   11/29/2021 11.20 (H)     Final Diagnosis   A: Prostate, MRI lesion x3, needle core biopsy:  - Adenocarcinoma, acinar type, grade group 2 ([place] score 3 + 4 = 7 of 10).  - Tumor involves 3 of 3 tissue fragment(s) and 12 mm of 36 mm of biopsy tissue (approximately 33% of biopsy tissue).  - Proportion of Jerald pattern 4: 20%.  - Perineural invasion identified.     B: Prostate, left lateral base, needle core biopsy:  - Negative for malignancy.     C: Prostate, left lateral mid, needle core biopsy:  - Adenocarcinoma, acinar type, grade group 1 ([place] score 3 + 3 = 6 of 10).  - Tumor involves 1 of 1 tissue fragment(s) and 1 mm of 11 mm of biopsy tissue (approximately 9% of biopsy tissue).     D: Prostate, left lateral apex, needle core biopsy:  - Negative for malignancy.  - Chronic inflammation present.     E: Prostate, left base, needle core biopsy:  - Negative for malignancy.     F: Prostate, left mid, needle core biopsy:  - Negative for malignancy.     G: Prostate, left apex, needle core biopsy:  - Adenocarcinoma, acinar type, grade group 1 ([place] score 3 + 3 = 6 of 10).  - Tumor involves 1 of 2 tissue fragment(s) and 1 mm of 10 mm of biopsy tissue (approximately 10% of biopsy tissue).     H: Prostate, right base, needle core biopsy:  - Negative for malignancy.     I: Prostate, right mid, needle core biopsy:  - Negative for malignancy.     J: Prostate, right apex, needle core biopsy:  - Negative for malignancy.  - Chronic inflammation present.     K: Prostate, right lateral base, needle core biopsy:  - Adenocarcinoma, acinar type, grade group 1 ([place] score 3 + 3 = 6 of 10).  - Tumor involves 1 of 1 tissue fragment(s) and 1 mm of 13 mm of biopsy tissue (approximately 8% of biopsy tissue).     L: Prostate, right lateral mid, needle core biopsy:  - Negative for malignancy.     M: Prostate, right  lateral apex, needle core biopsy:  - Negative for malignancy.         IMAGING:  All pertinent imaging reviewed:    All imaging studies reviewed by me.  I personally reviewed these imaging films.  A formal report from radiology will follow.    MRI PROSTATE 12/17/21:  FINDINGS:  Size: 54 grams  Hemorrhage: Absent  Peripheral zone: Heterogeneous on T2-weighted images. Suspicious  lesions as detailed below. Non- masslike diffuse T2 hypointense  peripheral zone with associated enhancement, likely represent  prostatitis.  Transition zone: Enlarged with BPH changes. Transition zone nodules  which are circumscribed or mostly encapsulated without diffusion  restriction.  PI-RADS 2.  No highly suspicious nodules.     Location: Bilateral apex peripheral zone at the 6 o'clock position  relative to the urethra. Series 13 image 18.   Additional prostate regions involved: None   Size: 13 x 7 mm  T2 description: Non-circumscribed rounded hyperintensity  T2 numerical assessment: 3  DWI description: Mild high signal on DWI with mild hypointense signal  on ADC map.  DWI numerical assessment: 3  DCE assessment: Negative, though there is diffuse enhancement  throughout the region.  Prostate margin: Capsular abutment<6 mm   Lesion overall PI-RADS category: 3     Neurovascular bundles: No neurovascular bundle involvement by  malignancy.    Seminal vesicles: No seminal vesicle involvement by malignancy.   Lymph nodes: No lymph node involvement  Bones: No suspicious lesions   Other pelvic organs: Diffuse bladder wall thickening and  trabeculation.                                                 IMPRESSION:  1. Based on the most suspicious abnormality, this exam is  characterized as PIRADS 3 - The presence of clinically significant  cancer is equivocal. The most suspicious abnormality is located at the  bilateral apex peripheral zone at the 6 o'clock position and there is  minimal capsular abutment with no convincing evidence  of  extraprostatic extension.  2. Non-masslike diffuse T2 hypointense peripheral zone with associated  enhancement, likely represent prostatitis.  3. No suspicious adenopathy or evidence of pelvic metastases.      ASSESSMENT and PLAN  51-year-old man with elevated PSA to 11.2 with an MRI showing a PI-RADS 3 lesion at the bilateral apex now status post an MR fusion biopsy with [place] 3+4 = 7 prostate cancer in 3/3 of the target biopsy cores and [place] 3+3 = 6 prostate cancer in 3/12 template cores    DISCUSSION PROSTATE CANCER     The natural history of this disease was explained to the patient at length and the treatment options discussed including radical prostatectomy by open or robotic-assisted laparoscopic approach, external-beam radiotherapy, brachytherapy, active surveillance and watchful waiting, including the probability of success and complications associated with these approaches.    With respect to watchful waiting, we discussed the difficulties of estimating the extent of disease preoperatively, the risk of cancer progression, and risk that salvage might not be possible with progression. However, the favorable 10-year outcomes of active surveillance in appropriately selected patients was conveyed.  We discussed that given his PSA elevation I would not recommend active surveillance for him.    With radiation therapy, we discussed the difficulties in diagnosing recurrent disease at an early stage due to variability in PSA levels and the fact that most patients are not candidates for local salvage therapy when biochemical recurrence is declared.  We also discussed the significant morbidity of local salvage therapy in terms of perioperative complications, erectile dysfunction and urinary incontinence.  We discussed that given his young age I would not recommend primary radiation therapy.    With respect to radical prostatectomy, the pros and cons of open vs robotic-assisted laparoscopic prostatectomy were  discussed. The complications of this procedure were reviewed with the patient and include (but not limited to) urinary incontinence, erectile dysfunction, infertility, anastomotic stricture, lymphocele, hemorrhage requiring transfusion, rectal, ureteral, or nerve injury, infection, cardiovascular, pulmonary, thromboembolic, and anesthetic complications.  For robotic prostatectomy, the additional complications of anastomotic urine leak and small bowel obstruction from adhesions was conveyed. The anticipated post-operative course was explained, including an anticipated 1 night hospital stay.  We discussed the plan would be for bilateral nerve sparing, though the priority always is cancer removal.    With surgery, we also discussed the potential advantage over radiation therapy in that biochemical recurrence can be detected at a relatively earlier stage and that salvage radiotherapy is successful in controlling recurrent disease in a substantial proportion of patients.  I also conveyed that salvage radiotherapy was associated with a considerably more favorable morbidity profile compared to local salvage therapies for radiorecurent disease.     All questions were answered.     Patient has decided he would like to proceed with Robotic Radical Prostatectomy - 66226    The risks, benefits, alternatives, and personnel involved in the procudure were discussed.  All questions were answered.  A written informed consent will be finalized on the morning of the procedure.    Plan:  -Given his PSA greater than 10 we discussed the need for a nuclear medicine bone scan in order for this is placed  - Orders for surgery placed    Ptear Tejeda MD        Time spent: 20 minutes spent on the date of the encounter doing chart review, history and exam, documentation and further activities as noted above.    Petar Tejeda MD   Urology  [facility]  [facility] Phone: [phone]  [facility]  [facility] Phone: [phone]      Petar Chapman  who is being evaluated via a billable video visit.      How would you like to obtain your AVS? MyChart  If the video visit is dropped, the invitation should be resent by: Text to cell phone: See chart  Will anyone else be joining your video visit? No    Video-Visit Details    Type of service:  Video Visit    Video Start Time: 4:36 PM    Video End Time:5:04 PM    Originating Location (pt. Location): Home    Distant Location (provider location):  [facility]     Platform used for Video Visit: [place]

## 2022-01-21 ENCOUNTER — HOSPITAL ENCOUNTER (INPATIENT)
Facility: CLINIC | Age: 52
Setting detail: SURGERY ADMIT
End: 2022-01-21
Attending: UROLOGY | Admitting: UROLOGY
Payer: COMMERCIAL

## 2022-02-04 ENCOUNTER — ANCILLARY PROCEDURE (OUTPATIENT)
Dept: NUCLEAR MEDICINE | Facility: CLINIC | Age: 52
End: 2022-02-04
Attending: UROLOGY
Payer: COMMERCIAL

## 2022-02-04 DIAGNOSIS — C61 PROSTATE CANCER (H): ICD-10-CM

## 2022-02-04 PROCEDURE — A9503 TC99M MEDRONATE: HCPCS | Performed by: STUDENT IN AN ORGANIZED HEALTH CARE EDUCATION/TRAINING PROGRAM

## 2022-02-04 PROCEDURE — 78306 BONE IMAGING WHOLE BODY: CPT | Mod: GC | Performed by: STUDENT IN AN ORGANIZED HEALTH CARE EDUCATION/TRAINING PROGRAM

## 2022-02-04 RX ORDER — TC 99M MEDRONATE 20 MG/10ML
20-30 INJECTION, POWDER, LYOPHILIZED, FOR SOLUTION INTRAVENOUS ONCE
Status: COMPLETED | OUTPATIENT
Start: 2022-02-04 | End: 2022-02-04

## 2022-02-04 RX ADMIN — TC 99M MEDRONATE 25.5 MCI.: 20 INJECTION, POWDER, LYOPHILIZED, FOR SOLUTION INTRAVENOUS at 09:26

## 2022-02-07 ENCOUNTER — TELEPHONE (OUTPATIENT)
Dept: UROLOGY | Facility: CLINIC | Age: 52
End: 2022-02-07
Payer: COMMERCIAL

## 2022-02-07 DIAGNOSIS — C61 PROSTATE CANCER (H): Primary | ICD-10-CM
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
## 2022-02-11 ENCOUNTER — ANCILLARY PROCEDURE (OUTPATIENT)
Dept: CT IMAGING | Facility: CLINIC | Age: 52
End: 2022-02-11
Attending: UROLOGY
Payer: COMMERCIAL

## 2022-02-11 DIAGNOSIS — C61 PROSTATE CANCER (H): ICD-10-CM

## 2022-02-11 PROCEDURE — 70450 CT HEAD/BRAIN W/O DYE: CPT | Mod: GC | Performed by: RADIOLOGY

## 2022-02-13 DIAGNOSIS — Z11.59 ENCOUNTER FOR SCREENING FOR OTHER VIRAL DISEASES: Primary | ICD-10-CM

## 2022-02-17 ENCOUNTER — TELEPHONE (OUTPATIENT)
Dept: UROLOGY | Facility: CLINIC | Age: 52
End: 2022-02-17
Payer: COMMERCIAL

## 2022-02-17 NOTE — TELEPHONE ENCOUNTER
Please call Petar  With results of his CT scan  He had in follow up of Bone scan . He is anxious  To get the results

## 2022-02-18 NOTE — TELEPHONE ENCOUNTER
Petar Chapman  [id number]  February 18, 2022  5:28 PM    I called and discussed with the patient regarding his CT results.  This did show a questionable lesion in his parietal bone.  I discussed this with several of my colleagues who agreed that we should plan to proceed with surgery given his young age and overall low PSA for bony metastasis.  If the postop PSA does not become undetectable, we discussed that he may require further investigation or treatment if this bone lesion does turn out to be a site of metastatic disease.  All questions answered.    Petar Tejeda M.D.

## 2022-02-25 ENCOUNTER — TELEPHONE (OUTPATIENT)
Dept: UROLOGY | Facility: CLINIC | Age: 52
End: 2022-02-25
Payer: COMMERCIAL

## 2022-02-25 NOTE — TELEPHONE ENCOUNTER
Pt called to cancel his prostatectomy surgery with Dr Tejeda.  Pt has decided to go to [facility] instead.

## 2022-07-21 ENCOUNTER — OFFICE VISIT (OUTPATIENT)
Dept: SLEEP MEDICINE | Facility: CLINIC | Age: 52
End: 2022-07-21
Payer: COMMERCIAL

## 2022-07-21 VITALS
DIASTOLIC BLOOD PRESSURE: 88 MMHG | HEART RATE: 61 BPM | SYSTOLIC BLOOD PRESSURE: 123 MMHG | BODY MASS INDEX: 35.66 KG/M2 | HEIGHT: 70 IN | WEIGHT: 249.1 LBS | OXYGEN SATURATION: 97 %

## 2022-07-21 DIAGNOSIS — R06.83 SNORING: ICD-10-CM

## 2022-07-21 DIAGNOSIS — R06.81 WITNESSED EPISODE OF APNEA: Primary | ICD-10-CM

## 2022-07-21 DIAGNOSIS — G47.63 SLEEP RELATED BRUXISM: ICD-10-CM

## 2022-07-21 DIAGNOSIS — G47.10 HYPERSOMNIA: ICD-10-CM

## 2022-07-21 PROCEDURE — 99204 OFFICE O/P NEW MOD 45 MIN: CPT | Performed by: INTERNAL MEDICINE

## 2022-07-21 RX ORDER — TADALAFIL 5 MG/1
TABLET ORAL
COMMUNITY
Start: 2022-03-25

## 2022-07-21 RX ORDER — ACETAMINOPHEN 500 MG
1000 TABLET ORAL PRN
COMMUNITY
Start: 2022-03-25

## 2022-07-21 ASSESSMENT — SLEEP AND FATIGUE QUESTIONNAIRES
HOW LIKELY ARE YOU TO NOD OFF OR FALL ASLEEP WHILE SITTING INACTIVE IN A PUBLIC PLACE: WOULD NEVER DOZE
HOW LIKELY ARE YOU TO NOD OFF OR FALL ASLEEP WHILE SITTING AND READING: MODERATE CHANCE OF DOZING
HOW LIKELY ARE YOU TO NOD OFF OR FALL ASLEEP IN A CAR, WHILE STOPPED FOR A FEW MINUTES IN TRAFFIC: WOULD NEVER DOZE
HOW LIKELY ARE YOU TO NOD OFF OR FALL ASLEEP WHILE LYING DOWN TO REST IN THE AFTERNOON WHEN CIRCUMSTANCES PERMIT: HIGH CHANCE OF DOZING
HOW LIKELY ARE YOU TO NOD OFF OR FALL ASLEEP WHILE SITTING AND TALKING TO SOMEONE: WOULD NEVER DOZE
HOW LIKELY ARE YOU TO NOD OFF OR FALL ASLEEP WHEN YOU ARE A PASSENGER IN A CAR FOR AN HOUR WITHOUT A BREAK: MODERATE CHANCE OF DOZING
HOW LIKELY ARE YOU TO NOD OFF OR FALL ASLEEP WHILE SITTING QUIETLY AFTER LUNCH WITHOUT ALCOHOL: SLIGHT CHANCE OF DOZING
HOW LIKELY ARE YOU TO NOD OFF OR FALL ASLEEP WHILE WATCHING TV: MODERATE CHANCE OF DOZING

## 2022-07-21 NOTE — PROGRESS NOTES
Additional 15 minutes on the date of service was spent performing the following:    -Preparing to see the patient  -Obtaining and/or reviewing separately obtained history   -Ordering medications, tests, or procedures   -Documenting clinical information in the electronic or other health record     Thank you for the opportunity to participate in the care of Mr. Petar Chapman.    Assessment and Plan:    In summary Petar Chapman is a 52 year old year old male here for sleep disturbance.  1. Witness apnea/Hypersomnia/Snoring/Sleep related bruxism   Mr. Petar Chapman has high risk for obstructive sleep apnea based on the history of witness apnea, hypersomnia, snoring and a crowded airway. I educated the patient on the underlying pathophysiology of obstructive sleep apnea. We reviewed the risks associated with sleep apnea, including increased cardiovascular risk and overall death. We talked about treatments briefly. I recommend getting an split-night nocturnal polysomnography. The patient should return to the clinic to discuss results and treatment option in a patient-centered approach.    History of present illness:    He is a 52 year old male who comes to the clinic with a chief complaint of excessive daytime sleepiness that has been going on for more than a year.  The patient's wife has informed me that he has significant pauses in his breathing during sleep followed by loud snoring.  His snoring is so loud that it can be heard across the house with the door closed.  The patient's wife also has observed him having episodes of grinding his teeth during sleep in the past.     Ideal Sleep-Wake Cycle(devoid of societal pressure):    Patient would try to initiate sleep at around 10 PM with a sleep latency of less than 15 minutes. The patient would have 2 awakening. Final wake up time is around 6-8 AM.    SI:  HANY Total Score: 12  Total score - [place]: 10 (7/21/2022 11:20 AM)    Patient told to return in one week  after the sleep study is interpreted.    Patient Active Problem List   Diagnosis     CARDIOVASCULAR SCREENING; LDL GOAL LESS THAN 160     Hearing loss     Family history of early CAD     Obesity     Shoulder pain, left       Past Medical History  No past medical history on file.     Past Surgical History  Past Surgical History:   Procedure Laterality Date     ORTHOPEDIC SURGERY  1982    right leg        Meds  Current Outpatient Medications   Medication Sig Dispense Refill     acetaminophen (TYLENOL) 500 MG tablet Take 1,000 mg by mouth as needed       tadalafil (CIALIS) 5 MG tablet           Allergies  Asa [aspirin] and Ibuprofen     Social History  Social History     Socioeconomic History     Marital status:      Spouse name: Not on file     Number of children: Not on file     Years of education: Not on file     Highest education level: Not on file   Occupational History     Not on file   Tobacco Use     Smoking status: Former Smoker     Smokeless tobacco: Former User     Quit date: 3/15/1995     Tobacco comment: snuff   2010   Substance and Sexual Activity     Alcohol use: Yes     Comment: OCC     Drug use: No     Sexual activity: Yes     Partners: Female     Birth control/protection: None   Other Topics Concern     Parent/sibling w/ CABG, MI or angioplasty before 65F 55M? Not Asked      Service Yes     Blood Transfusions No     Caffeine Concern No     Occupational Exposure No     Hobby Hazards No     Sleep Concern No     Stress Concern No     Weight Concern Yes     Special Diet No     Back Care No     Exercise No     Bike Helmet No     Seat Belt Yes     Self-Exams Yes     Comment: i gave a handout   Social History Narrative     Not on file     Social Determinants of Health     Financial Resource Strain: Not on file   Food Insecurity: Not on file   Transportation Needs: Not on file   Physical Activity: Not on file   Stress: Not on file   Social Connections: Not on file   Intimate Partner Violence:  "Not on file   Housing Stability: Not on file        Family History  Family History   Problem Relation Age of Onset     Sleep Apnea Mother      Heart Disease Father 32        mi non-smoker     Snoring Father      Cancer Maternal Grandmother      Heart Disease Paternal Grandmother       Review of Systems:  Constitutional: Negative except as noted in HPI.   Eyes: Negative except as noted in HPI.   ENT: Negative except as noted in HPI.   Cardiovascular: Negative except as noted in HPI.   Respiratory: Negative except as noted in HPI.   Gastrointestinal: Negative except as noted in HPI.   Genitourinary: Negative except as noted in HPI.   Musculoskeletal: Negative except as noted in HPI.   Integumentary: Negative except as noted in HPI.   Neurological: Negative except as noted in HPI.   Psychiatric: Negative except as noted in HPI.   Endocrine: Negative except as noted in HPI.   Hematologic/Lymphatic: Negative except as noted in HPI.      Physical Exam:  /88   Pulse 61   Ht 1.778 m (5' 10\")   Wt 113 kg (249 lb 1.6 oz)   SpO2 97%   BMI 35.74 kg/m    BMI:Body mass index is 35.74 kg/m .   GEN: NAD, obese  Head: Normocephalic.  EYES: PERRLA, EOMI  ENT: Oropharynx is clear, Dunn class 4+ airway.   Nasal mucosa is moist without erythema  Neck : Thyroid is within normal limits.   CV: Regular rate and rhythm, S1 & S2 positive.  LUNGS: Bilateral breathsounds heard.   ABDOMEN: Positive bowel sounds in all quadrants, soft, no rebound or guarding  MUSCULOSKELETAL: Bilateral trace leg swelling  SKIN: warm, dry, no rashes  Neurological: Alert, oriented to time, place, and person.  Psych: normal mood, normal affect     Labs/Studies:     No results found for: PH, PHARTERIAL, PO2, BK3VULSNOBP, SAT, PCO2, HCO3, BASEEXCESS, MARLENA, BEB  Lab Results   Component Value Date    TSH 1.10 09/09/2010     Lab Results   Component Value Date     (H) 11/01/2021     (H) 07/17/2018     Lab Results   Component Value Date    HGB " 16.2 06/29/2015    HGB 15.7 05/22/2012     Lab Results   Component Value Date    BUN 21 11/01/2021    BUN 23 06/29/2015    CR 1.13 11/01/2021    CR 1.15 06/29/2015     Lab Results   Component Value Date    AST 40 11/01/2021    AST 29 06/29/2015     (H) 11/01/2021    ALT 58 06/29/2015    ALKPHOS 112 11/01/2021    ALKPHOS 121 06/29/2015    BILITOTAL 0.7 11/01/2021    BILITOTAL 0.7 06/29/2015     No results found for: UAMP, UBARB, BENZODIAZEUR, UCANN, UCOC, OPIT, UPCP      Patient verbalized understanding of these issues, agrees with the plan and all questions were answered today. Patient was given an opportuntity to voice any other symptoms or concerns not listed above. Patient did not have any other symptoms or concerns.         Sandeep Adan DO,   Board Certified in Internal Medicine and Sleep Medicine    (Note created with Dragon voice recognition and unintended spelling errors and word substitutions may occur)

## 2022-07-21 NOTE — PATIENT INSTRUCTIONS
Your BMI is Body mass index is 35.74 kg/m .    What is BMI?  Body mass index (BMI) is one way to tell whether you are at a healthy weight, overweight, or obese. It measures your weight in relation to your height.  A BMI of 18.5 to 24.9 is in the healthy range. A person with a BMI of 25 to 29.9 is considered overweight, and someone with a BMI of 30 or greater is considered obese.  Another way to find out if you are at risk for health problems caused by overweight and obesity is to measure your waist. If you are a woman and your waist is more than 35 inches, or if you are a man and your waist is more than 40 inches, your risk of disease may be higher.  More than two-thirds of American adults are considered overweight or obese. Being overweight or obese increases the risk for further weight gain.  Excess weight may lead to heart disease and diabetes. Creating and following plans for healthy eating and physical activity may help you improve your health.    Methods for maintaining or losing weight.  Weight control is part of healthy lifestyle and includes exercise, emotional health, and healthy eating habits.  Careful eating habits lifelong is the mainstay of weight control.  Though there are significant health benefits from weight loss, long-term weight loss with diet alone may be very difficult to achieve- studies show long-term success with dietary management in less than 10% of people. Attaining a healthy weight may be especially difficult to achieve in those with severe obesity. In some cases, medications, devices and surgical management might be considered.    What can you do?  If you are overweight or obese and are interested in methods for weight loss, you should discuss this with your provider. In addition, we recommend that you review healthy life styles and methods for weight loss available through the National Institutes of Health patient information sites:    http://win.niddk.nih.gov/publications/index.htm    Patient education: What is a sleep study?     What is a sleep study? -- A sleep study is a test that measures how well you sleep and checks for sleep problems. For some sleep studies, you stay overnight in a sleep lab at a hospital or sleep center.     What happens during a sleep study? -- Before you go to sleep, a technician attaches small, sticky patches called  electrodes  to your head, chest, and legs. He or she will also place a small tube beneath your nose and might wrap 1 or 2 belts around your chest.   Each of these items has wires that connect to monitors. The monitors record your movement, brain activity, breathing, and other body functions while you sleep.  If you have a history of trouble falling asleep, your doctor might prescribe a medicine to help you fall asleep in the lab. If you have never taken the medicine before, your doctor might ask you take it on a night before your sleep study to see how it affects you.   Why might my doctor order a sleep study? -- Your doctor will order a sleep study if he or she thinks you have sleep apnea or a different condition that makes you:   ?Have sudden jerking leg movements while you sleep, called  periodic limb movements.    ?Feel very sleepy during the day and fall asleep all of a sudden, called  narcolepsy.    ?Have trouble falling asleep or staying asleep over a long period of time, called  chronic insomnia.    ?Do odd things while you sleep, such as walking.  How should I prepare for a sleep study? -- On the day of your sleep study, you should:   ?Avoid alcohol   ?Avoid drinking coffee, tea, sodas, and other drinks that have caffeine in the afternoon and evening   ?Take all of your regular medicines     The cost of care estimate line is [phone]. They are able to give the patient an estimate of the charges and also an estimate of their insurance coverage/patient responsibility.   After your sleep study  is performed, please call us at [phone] or [phone]  to schedule for a follow up to review the results of the sleep study.    Please bring one tab of low dose melatonin 3 mg or less to the night of the study.    Melatonin intake is completely voluntary.    You may take own melatonin after arrival to sleep center. Do not drive or operate machinery after intake of melatonin.

## 2022-09-08 ENCOUNTER — TELEPHONE (OUTPATIENT)
Dept: FAMILY MEDICINE | Facility: CLINIC | Age: 52
End: 2022-09-08

## 2022-09-08 DIAGNOSIS — R69 DIAGNOSIS UNKNOWN: Primary | ICD-10-CM

## 2022-09-08 NOTE — TELEPHONE ENCOUNTER
Reason for call:  Other   Patient called regarding (reason for call): call back  Additional comments: Pt's wife called to say that [facility], sent out a letter stating that they need more information to determine if this is medically necessary in regards to his upcoming sleep study on 09/22/2022.   1. Please provide BMI and Sleep Survey completed by member and a comorbid conditions that meet guidelines for an attended sleep study.   Insurance letter says to fax to [phone]. If any questions call pt's wife, Sofie.      Phone number to reach patient:  Other phone number:  [phone]*    Best Time:  Anytime    Can we leave a detailed message on this number?  YES    Travel screening: Not Applicable

## 2022-09-09 NOTE — TELEPHONE ENCOUNTER
Billing team is handling this situation. Patient was made aware.     Garima Gamez RN on 9/9/2022 at 2:14 PM

## 2022-09-12 ENCOUNTER — TELEPHONE (OUTPATIENT)
Dept: SLEEP MEDICINE | Facility: CLINIC | Age: 52
End: 2022-09-12

## 2022-09-12 DIAGNOSIS — R06.81 WITNESSED EPISODE OF APNEA: Primary | ICD-10-CM

## 2022-09-12 DIAGNOSIS — R06.83 SNORING: ICD-10-CM

## 2022-09-12 DIAGNOSIS — G47.10 HYPERSOMNIA: ICD-10-CM

## 2022-09-12 DIAGNOSIS — G47.63 SLEEP RELATED BRUXISM: ICD-10-CM

## 2022-09-12 NOTE — TELEPHONE ENCOUNTER
Received following email please review     Peer to Peer Request    Patient Name:                        Petar Chapman  :                                      1970  MRN:                                      [id number]        Dr. Adan,    The authorization for procedure (description) PSG SPLIT on date of service  has been denied. We were unsuccessful in obtaining approval through clinical review. A [id number] review can be done by calling the insurance/third party authorization vendor with the following information:    Insurance:       [employer] Vendor:   [employer]  Phone:             [phone]  Due:                asap    Clinicals already Provided  Order:                          N/A  Visit Notes:                  22  Results:                       N/A  Other:                          N/A    Patient ID:       [id number]  Case/Ref #:     [id number]     Patient contact:           N/A  Patient response:        na  Patient Phone #:         na    Denial Reason: Pts lacks qualifying comorbid conditions, meets review criteria for HST.      If you feel you have additional clinical information that could get this denial overturned, please complete the Peer to Peer.  If you choose not to do the P2P, please let me know as soon as possible so that we can reach out to the patient and advise them of their denial.      Thank you,    Jenn MELO  [facility]        Jenn Ramires (previously: Jenn Lucas)  Financial Securing Representative.Sleep Medicine.  [facility] [employer]  [email or web address]  Office: [phone]  Fax [phone]  Employed by [facility] services  Business Hours M-F  6:30AM-3:00PM  [address] [place] [address]

## 2022-10-12 ENCOUNTER — OFFICE VISIT (OUTPATIENT)
Dept: SLEEP MEDICINE | Facility: CLINIC | Age: 52
End: 2022-10-12
Payer: COMMERCIAL

## 2022-10-12 DIAGNOSIS — R06.83 SNORING: ICD-10-CM

## 2022-10-12 DIAGNOSIS — G47.63 SLEEP RELATED BRUXISM: ICD-10-CM

## 2022-10-12 DIAGNOSIS — G47.10 HYPERSOMNIA: ICD-10-CM

## 2022-10-12 DIAGNOSIS — R06.81 WITNESSED EPISODE OF APNEA: ICD-10-CM

## 2022-10-12 PROCEDURE — G0399 HOME SLEEP TEST/TYPE 3 PORTA: HCPCS | Performed by: INTERNAL MEDICINE

## 2022-10-12 NOTE — PROGRESS NOTES
Pt is completing a home sleep test. Pt was instructed on how to put on the Noxturnal T3 device and associated equipment before going to bed and given the opportunity to practice putting it on before leaving the sleep center. Pt was reminded to bring the home sleep test kit back to the center tomorrow, at agreed upon time for download and reporting.   Ai Jiang CMA, HST Specialist  [place] / [facility]

## 2022-10-13 ENCOUNTER — DOCUMENTATION ONLY (OUTPATIENT)
Dept: SLEEP MEDICINE | Facility: CLINIC | Age: 52
End: 2022-10-13
Payer: COMMERCIAL

## 2022-10-13 NOTE — PROGRESS NOTES
This HSAT was performed using a Noxturnal T3 device which recorded snore, sound, movement activity, body position, nasal pressure, oronasal thermal airflow, pulse, oximetry and both chest and abdominal respiratory effort. HSAT data was restricted to the time patient states they were in bed.     HSAT was scored using 1B 4% hypopnea rule.     HST AHI (Non-PAT): 23.7  Snoring was reported as moderate.  Time with SpO2 below 89% was 6.2 minutes.   Overall signal quality was good     Pt will follow up with sleep provider to determine appropriate therapy.

## 2022-10-13 NOTE — PROCEDURES
"HOME SLEEP STUDY INTERPRETATION    Patient: Petar Chapman  MRN: [id number]  YOB: 1970  Study Date: 10/12/2022  Referring Provider: Stacie Arriaza  Ordering Provider: Sandeep Adan DO      Indications for Home Study: Petar Chapman is a 52 year old male with who presents with symptoms suggestive of obstructive sleep apnea.    Estimated body mass index is 35.74 kg/m  as calculated from the following:    Height as of 7/21/22: 1.778 m (5' 10\").    Weight as of 7/21/22: 113 kg (249 lb 1.6 oz).  Total score - [place]: 10 (7/21/2022 11:20 AM)  Total Score: 6 (7/21/2022 11:24 AM)    Data: A full night home sleep study was performed recording the standard physiologic parameters including body position, movement, sound, nasal pressure, thermal oral airflow, chest and abdominal movements with respiratory inductance plethysmography, and oxygen saturation by pulse oximetry. Pulse rate was estimated by oximetry recording. This study was considered adequate based on > 4 hours of quality oximetry and respiratory recording. As specified by the AASM Manual for the Scoring of Sleep and Associated events, version 2.3, Rule VIII.D 1B, 4% oxygen desaturation scoring for hypopneas is used as a standard of care on all home sleep apnea testing.    Analysis Time:  506.5 minutes    Respiration:   Sleep Associated Hypoxemia: sustained hypoxemia was present. Baseline oxygen saturation was 96%.  Time with saturation less than or equal to 88% was 6.2 minutes. The lowest oxygen saturation was 79%.   Snoring: Snoring was present.  Respiratory events: The home study revealed a presence of 26 obstructive apneas and 3 mixed and central apneas. There were 171 hypopneas resulting in a combined apnea/hypopnea index [AHI] of 23.7 events per hour.  AHI was 56.4 per hour supine, N/A per hour prone, 6 per hour on left side, and 6.3 per hour on right side.   Pattern: Excluding events noted above, respiratory rate and " pattern was Normal.    Position: Percent of time spent: supine - 34.8%, prone - 0%, on left - 21.9%, on right - 43.1%.    Heart Rate: By pulse oximetry normal rate was noted.     Assessment:   Moderate obstructive sleep apnea.  Sleep associated hypoxemia was present.    Recommendations:  Consider auto-CPAP at 5-20 cmH2O, oral appliance therapy or positional therapy.  Suggest optimizing sleep hygiene and avoiding sleep deprivation.  Weight management.    Diagnosis Code(s): Obstructive Sleep Apnea G47.33, Hypoxemia G47.36    Sandeep Adan DO, October 13, 2022   Diplomate, American Board of Internal Medicine, Sleep Medicine

## 2022-10-14 NOTE — PROGRESS NOTES
Pt returned HST device. It was downloaded and forwarded data to the clinical specialist for scoring.    Ai Jiang CMA, HST Specialist  [place] / [facility]

## 2022-10-16 NOTE — PROGRESS NOTES
HST POST-STUDY QUESTIONNAIRE    1. What time did you go to bed?  22:20  2. How long do you think it took to fall asleep?  10 min  3. What time did you wake up to start the day?  07:15  4. Did you get up during the night at all?  Yes  5. If you woke up, do you remember approximately what time(s)? 00:20  6. Did you have any difficulty with the equipment?  No  7. Did you us any type of treatment with this study?  None  8. Was the head of the bed elevated? No  9. Did you sleep in a recliner?  No  10. Did you stop using CPAP at least 3 days before this test?  NA  11. Any other information you'd like us to know?

## 2022-11-08 ENCOUNTER — VIRTUAL VISIT (OUTPATIENT)
Dept: SLEEP MEDICINE | Facility: CLINIC | Age: 52
End: 2022-11-08
Payer: COMMERCIAL

## 2022-11-08 VITALS — BODY MASS INDEX: 35.55 KG/M2 | HEIGHT: 69 IN | WEIGHT: 240 LBS

## 2022-11-08 DIAGNOSIS — G47.33 OBSTRUCTIVE SLEEP APNEA: Primary | ICD-10-CM

## 2022-11-08 PROCEDURE — 99213 OFFICE O/P EST LOW 20 MIN: CPT | Mod: 95 | Performed by: INTERNAL MEDICINE

## 2022-11-08 ASSESSMENT — SLEEP AND FATIGUE QUESTIONNAIRES
HOW LIKELY ARE YOU TO NOD OFF OR FALL ASLEEP WHILE SITTING INACTIVE IN A PUBLIC PLACE: SLIGHT CHANCE OF DOZING
HOW LIKELY ARE YOU TO NOD OFF OR FALL ASLEEP IN A CAR, WHILE STOPPED FOR A FEW MINUTES IN TRAFFIC: SLIGHT CHANCE OF DOZING
HOW LIKELY ARE YOU TO NOD OFF OR FALL ASLEEP WHILE LYING DOWN TO REST IN THE AFTERNOON WHEN CIRCUMSTANCES PERMIT: HIGH CHANCE OF DOZING
HOW LIKELY ARE YOU TO NOD OFF OR FALL ASLEEP WHILE SITTING QUIETLY AFTER LUNCH WITHOUT ALCOHOL: SLIGHT CHANCE OF DOZING
HOW LIKELY ARE YOU TO NOD OFF OR FALL ASLEEP WHEN YOU ARE A PASSENGER IN A CAR FOR AN HOUR WITHOUT A BREAK: SLIGHT CHANCE OF DOZING
HOW LIKELY ARE YOU TO NOD OFF OR FALL ASLEEP WHILE WATCHING TV: MODERATE CHANCE OF DOZING
HOW LIKELY ARE YOU TO NOD OFF OR FALL ASLEEP WHILE SITTING AND READING: SLIGHT CHANCE OF DOZING
HOW LIKELY ARE YOU TO NOD OFF OR FALL ASLEEP WHILE SITTING AND TALKING TO SOMEONE: SLIGHT CHANCE OF DOZING

## 2022-11-08 ASSESSMENT — PAIN SCALES - GENERAL: PAINLEVEL: NO PAIN (0)

## 2022-11-08 NOTE — PROGRESS NOTES
Petar is a 52 year old who is being evaluated via a billable video visit.      How would you like to obtain your AVS? MyChart  If the video visit is dropped, the invitation should be resent by: Text to cell phone: [phone]  Will anyone else be joining your video visit? Melissa    René Lo    Video-Visit Details    Video Start Time: 7:56 AM    Type of service:  Video Visit    Video End Time:8:10 AM    Originating Location (pt. Location): Home    Distant Location (provider location):  Off-site    [facility]     Additional 10 minutes on the date of service was spent performing the following:    -Preparing to see the patient (eg, review of tests)   -Ordering medications, tests, or procedures   -Documenting clinical information in the electronic or other health record     Thank you for the opportunity to participate in the care of Petar Chapman.     He is a 52 year old  male patient who comes to the sleep medicine clinic for review of sleep study results. The study was completed on 10/12/2022  which showed that the patient had moderate obstructive sleep apnea with an apnea hypopnea index of 23.7 events per hour with the lowest O2 Sat of 79%.    Assessment and Plan:  In summary Petar Chapman is a 52 year old year old male here for review of sleep study results.  1. Obstructive Sleep Apnea  We had an extensive conversation to review the results of his sleep study and to  him on the importance of treating sleep apnea. We discussed the options of treatment with oral appliance versus CPAP. Patient decided to proceed with CPAP. He will start using the device as soon as he receives it with the intention to use if for the entire night. We discussed some tips to increase PAP tolerance as well as the normal curve of adaptation. CPAP is going to provide improved respiratory function during the night but it can cause some sleep disruption that tends to improve with continuous usage. He  should return to the clinic in 7 weeks to review compliance and efficacy monitoring. Since the patient does not have any preference, we will use [employer] as the [employer] company.     HANY:  HANY Total Score: 7  Total score - [place]: 11 (11/8/2022  7:36 AM)    Patient Active Problem List   Diagnosis     CARDIOVASCULAR SCREENING; LDL GOAL LESS THAN 160     Hearing loss     Family history of early CAD     Obesity     Shoulder pain, left       Current Outpatient Medications   Medication Sig Dispense Refill     acetaminophen (TYLENOL) 500 MG tablet Take 1,000 mg by mouth as needed       tadalafil (CIALIS) 5 MG tablet          Allergies   Allergen Reactions     Asa [Aspirin] Swelling     Ibuprofen Swelling     Face edematous with aspirin       Physical Exam:  GEN: NAD  Psych: normal mood, normal affect     Labs/Studies:  - We reviewed the results of the overnight study as described on the HPI.     No results found for: PH, PHARTERIAL, PO2, QK3ICGWKTED, SAT, PCO2, HCO3, BASEEXCESS, MARLENA, BEB  Lab Results   Component Value Date    TSH 1.10 09/09/2010     Lab Results   Component Value Date     (H) 11/01/2021     (H) 07/17/2018     Lab Results   Component Value Date    HGB 16.2 06/29/2015    HGB 15.7 05/22/2012     Lab Results   Component Value Date    BUN 21 11/01/2021    BUN 23 06/29/2015    CR 1.13 11/01/2021    CR 1.15 06/29/2015     Lab Results   Component Value Date    AST 40 11/01/2021    AST 29 06/29/2015     (H) 11/01/2021    ALT 58 06/29/2015    ALKPHOS 112 11/01/2021    ALKPHOS 121 06/29/2015    BILITOTAL 0.7 11/01/2021    BILITOTAL 0.7 06/29/2015     No results found for: UAMP, UBARB, BENZODIAZEUR, UCANN, UCOC, OPIT, UPCP    Recent Labs   Lab Test 11/01/21  0818 07/17/18  0736 06/29/15  1030     --  139   POTASSIUM 3.9  --  4.3   CHLORIDE 105  --  105   CO2 26  --  29   ANIONGAP 6  --  5   * 114* 105*   BUN 21  --  23   CR 1.13  --  1.15   EDDIE 9.4  --  9.1        No results found for: ANG      Patient verbalized understanding of these issues, agrees with the plan and all questions were answered today. Patient was given an opportuntity to voice any other symptoms or concerns not listed above. Patient did not have any other symptoms or concerns.      Sandeep Adan DO  Board Certified in Internal Medicine and Sleep Medicine      (Note created with Dragon voice recognition and unintended spelling errors and word substitutions may occur)

## 2022-11-08 NOTE — PATIENT INSTRUCTIONS
"     What is sleep apnea?     Sleep apnea is a condition that makes you stop breathing for short periods while you are asleep. There are 2 types of sleep apnea. One is called \"obstructive sleep apnea,\" and the other is called \"central sleep apnea.\"  In obstructive sleep apnea, you stop breathing because your throat narrows or closes (figure 1). In central sleep apnea, you stop breathing because your brain does not send the right signals to your muscles to make you breathe. When people talk about sleep apnea, they are usually referring to obstructive sleep apnea, which is what this article is about.  People with sleep apnea do not know that they stop breathing when they are asleep. But they do sometimes wake up startled or gasping for breath. They also often hear from loved ones that they snore.  What are the symptoms of sleep apnea? -- The main symptoms of sleep apnea are loud snoring, tiredness, and daytime sleepiness. Other symptoms can include:  ?Restless sleep  ?Waking up choking or gasping  ?Morning headaches, dry mouth, or sore throat  ?Waking up often to urinate  ?Waking up feeling unrested or groggy  ?Trouble thinking clearly or remembering things  Some people with sleep apnea don't have symptoms, or they don't know they have them. They might figure that it's normal to be tired or to snore a lot.  Should I see a doctor or nurse? -- Yes. If you think you might have sleep apnea, see your doctor.  Is there a test for sleep apnea? -- Yes. If your doctor or nurse suspects you have sleep apnea, he or she might send you for a \"sleep study.\" Sleep studies can sometimes be done at home, but they are usually done in a sleep lab. For the study, you spend the night in the lab, and you are hooked up to different machines that monitor your heart rate, breathing, and other body functions. The results of the test will tell your doctor or nurse if you have the disorder.  Is there anything I can do on my own to help my sleep " "apnea? -- Yes. Here are some things that might help:  ?Stay off your back when sleeping. (This is not always practical, because people cannot control their position while asleep. Plus, it only helps some people.)  ?Lose weight, if you are overweight  ?Avoid alcohol, because it can make sleep apnea worse  How is sleep apnea treated? -- The most effective treatment for sleep apnea is a device that keeps your airway open while you sleep. Treatment with this device is called \"continuous positive airway pressure,\" or CPAP. People getting CPAP wear a face mask at night that keeps them breathing (figure 2).  If your doctor or nurse recommends a CPAP machine, try to be patient about using it. The mask might seem uncomfortable to wear at first, and the machine might seem noisy, but using the machine can really pay off. People with sleep apnea who use a CPAP machine feel more rested and generally feel better.  There is also another device that you wear in your mouth called an \"oral appliance\" or \"mandibular advancement device.\" It also helps keep your airway open while you sleep.  In rare cases, when nothing else helps, doctors recommend surgery to keep the airway open. Surgery to do this is not always effective, and even when it is, the problem can come back.  Is sleep apnea dangerous? -- It can be. People with sleep apnea do not get good-quality sleep, so they are often tired and not alert. This puts them at risk for car accidents and other types of accidents. Plus, studies show that people with sleep apnea are more likely than others to have high blood pressure, heart attacks, and other serious heart problems. In people with severe sleep apnea, getting treated (for example, with a CPAP machine) can help prevent some of these problems.     GRAPHICS  Airway in a person with sleep apnea    Normally when a person sleeps, the airway remains open, and air can pass from the nose and mouth to the lungs. In a person with sleep " apnea, parts of the throat and mouth drop into the airway and block off the flow of air. This can cause loud snoring and interrupt breathing for short periods.  Graphic 56190 Version 5.0      Continuous positive airway pressure (CPAP) for sleep apnea    The CPAP mask gently blows air into your nose while you sleep. It puts just enough pressure on your airway to keep it from closing. The mask in this picture fits over just the nose. Other CPAP devices have masks that fit over the nose and mouth.    Equipment Instructions    We will process your PAP order and send it to a Durable Medical Equipment (DME) provider.    The medical equipment company should call you within 7 days.  If you have not heard from the company, please contact them to see if they received your order and are planning to call you.    Please call us at [phone] if you are unable to contact the medical equipment company or if they do not have the order.    If you are starting a new PAP machine, please call us after you use it the first night to let us know how it went. This call also helps us know that you received your equipment and that everything is ready. Please use our central phone number [phone]    Contact information for [email or web address] company:    [employer] Tel: [phone]

## 2022-11-16 ENCOUNTER — DOCUMENTATION ONLY (OUTPATIENT)
Dept: SLEEP MEDICINE | Facility: CLINIC | Age: 52
End: 2022-11-16

## 2022-11-16 NOTE — PROGRESS NOTES
While going over the billing of patient's CPAP machine, he declined getting set up. He states he does not have the funds and would rather go without it. I gave patient a copy of his order and informed him if he changes his mind, to give us a call to reschedule the appointment.

## 2022-11-18 ENCOUNTER — DOCUMENTATION ONLY (OUTPATIENT)
Dept: SLEEP MEDICINE | Facility: CLINIC | Age: 52
End: 2022-11-18
Payer: COMMERCIAL

## 2022-11-18 DIAGNOSIS — G47.33 OBSTRUCTIVE SLEEP APNEA (ADULT) (PEDIATRIC): Primary | ICD-10-CM

## 2022-11-18 NOTE — PROGRESS NOTES
Patient was offered choice of vendor and chose [facility].  Patient Petar Chapman was set up at [place]  on November 18, 2022. Patient received a Resmed Airsense 11 Pressures were set at 5-20 cm H2O.   Patient s ramp is 5 cm H2O for Off and FLEX/EPR is EPR, 2.  Patient received a Resmed Mask name: Airfit N20 Nasal mask size Large, heated tubing and heated humidifier.  Patient does not need to meet compliance. Patient has a follow up on TBD with Dr. Neville Keane Her

## 2022-11-21 ENCOUNTER — DOCUMENTATION ONLY (OUTPATIENT)
Dept: SLEEP MEDICINE | Facility: CLINIC | Age: 52
End: 2022-11-21
Payer: COMMERCIAL

## 2022-11-21 NOTE — PROGRESS NOTES
3 day Sleep therapy management telephone visit    Diagnostic AHI:  23.7 HST    Confirmed with patient at time of call- N/A Patient is still interested in STM service       Message left for patient to return call    Order settings:  CPAP MIN CPAP MAX   5 cm H2O 20 cm H2O       Device settings:  CPAP MIN CPAP MAX EPR RESMED SOFT RESPONSE SETTING   5 cm  H20 20 cm  H20 2 OFF       Compliance 0 %    Assessment: No usage but has account in [facility]       Action plan: Patient to have 14 day STM visit. Patient has a follow up visit scheduled:   no and not required by insurance    Replacement device: No  STM ordered by provider: Yes     Total time spent on accessing and  interpreting remote patient PAP therapy data  10 minutes    Total time spent counseling, coaching  and reviewing PAP therapy data with patient  1 minutes    [id number] no

## 2022-12-08 ENCOUNTER — DOCUMENTATION ONLY (OUTPATIENT)
Dept: SLEEP MEDICINE | Facility: CLINIC | Age: 52
End: 2022-12-08
Payer: COMMERCIAL

## 2022-12-08 NOTE — PROGRESS NOTES
14  DAY STM VISIT    Diagnostic AHI:  23.7 HST    Message left for patient to return call     Assessment: Pt not meeting objective benchmarks for leak and compliance      Action plan: waiting for patient to return call.  and pt to have 30 day STM visit.      Device type: Auto-CPAP    PAP settings:  CPAP MIN CPAP MAX 95TH % PRESSURE EPR RESMED SOFT RESPONSE SETTING   5.0 cm  H20 20.0 cm  H20 9.4 cm  H20  TWO OFF     Mask type:  Nasal Mask    Objective measures: 14 day rolling measures   COMPLIANCE LEAK AHI AVERAGE USE IN MINUTES   64 % 26.3 1.25 319   GOAL >70% GOAL < 24 LPM GOAL <5 GOAL >240          Total time spent on accessing and interpreting remote patient PAP therapy data  10 minutes    Total time spent counseling, coaching  and reviewing PAP therapy data with patient  1 minute    [id number]  [id number]  no (3 day STM)

## 2022-12-12 ENCOUNTER — DOCUMENTATION ONLY (OUTPATIENT)
Dept: SLEEP MEDICINE | Facility: CLINIC | Age: 52
End: 2022-12-12

## 2022-12-12 DIAGNOSIS — G47.33 OBSTRUCTIVE SLEEP APNEA (ADULT) (PEDIATRIC): Primary | ICD-10-CM

## 2022-12-12 NOTE — PROGRESS NOTES
DATE: 12/12/22  LOCATION OF MASK FITTING: [facility]  REASON FOR MASK FITTING: MASK DISCOMFORT  MASK AND SIZE SELECTED:AIRFIT P30I

## 2022-12-20 ENCOUNTER — DOCUMENTATION ONLY (OUTPATIENT)
Dept: SLEEP MEDICINE | Facility: CLINIC | Age: 52
End: 2022-12-20
Payer: COMMERCIAL

## 2022-12-20 NOTE — PROGRESS NOTES
30 DAY STM VISIT    Diagnostic AHI:  23.7 HST    PAP settings:  CPAP MIN CPAP MAX 95TH % PRESSURE EPR RESMED SOFT RESPONSE SETTING   5.0 cm  H20 20.0 cm  H20 9.5 cm  H20  TWO OFF     Device type: Auto-CPAP  Mask type:  Nasal Pillows    Objective measures: 14 day rolling measures:    COMPLIANCE LEAK AHI AVERAGE USE IN MINUTES   64 % 27.17 0.6 293   GOAL >70% GOAL < 24 LPM GOAL <5 GOAL >240        Assessment: Pt not meeting objective benchmarks for leak and compliance  Patient failing following subjective benchmarks: noise from machine or mask keeping bed partner awake   Subjective measures: Patient reports he is getting used to CPAP. He had water in his tubing last night. Recommended patient increase tube temp. He says he just lowered it because the air was too warm. Recommended patient decrease humidity.   Action plan: pt to have 6 month [facility] visit  Patient has not scheduled a follow up visit .     Total time spent on accessing and interpreting remote patient PAP therapy data  10 minutes    Total time spent counseling, coaching  and reviewing PAP therapy data with patient  2 minutes     [id number] this call  [id number] no  at 3 or 14 day [facility]

## 2024-02-15 NOTE — PATIENT INSTRUCTIONS
Continue rest, ice, elevation  Tylenol if needed  Let me know if symptoms worsen or change and we can get an xray  Osmany tape your toe until pain free with walking  Wear ankle support for at least two weeks then you can stop if completely pain free with walking     For information on Fall & Injury Prevention, visit: [email or web address] OR  [email or web address] OR  https://www.cdc.gov/steadi/patient.html

## 2024-09-11 NOTE — TELEPHONE ENCOUNTER
Action 9/11/2024   Action Taken 1) Called patient to ask about prior treatment/imaging. Patient did not answer and voicemail was left.      REASON FOR VISIT: left knee pain    DATE OF APPT: 9/30/2024   NOTES (FOR ALL VISITS) STATUS DETAILS   OFFICE NOTE from referring provider In process    OFFICE NOTE from other specialist In process    DISCHARGE SUMMARY from hospital In process    OPERATIVE REPORT In process    MEDICATION LIST In process    IMAGING  (FOR ALL VISITS)     XR In process    MRI (HEAD, NECK, SPINE) In process    CT (HEAD, NECK, SPINE) In process

## 2024-09-30 ENCOUNTER — OFFICE VISIT (OUTPATIENT)
Dept: ORTHOPEDICS | Facility: CLINIC | Age: 54
End: 2024-09-30
Payer: COMMERCIAL

## 2024-09-30 ENCOUNTER — PRE VISIT (OUTPATIENT)
Dept: ORTHOPEDICS | Facility: CLINIC | Age: 54
End: 2024-09-30

## 2024-09-30 ENCOUNTER — ANCILLARY PROCEDURE (OUTPATIENT)
Dept: GENERAL RADIOLOGY | Facility: CLINIC | Age: 54
End: 2024-09-30
Attending: FAMILY MEDICINE
Payer: COMMERCIAL

## 2024-09-30 DIAGNOSIS — M25.562 LEFT KNEE PAIN: ICD-10-CM

## 2024-09-30 DIAGNOSIS — M25.562 ACUTE PAIN OF LEFT KNEE: Primary | ICD-10-CM

## 2024-09-30 PROCEDURE — 73564 X-RAY EXAM KNEE 4 OR MORE: CPT | Mod: LT | Performed by: RADIOLOGY

## 2024-09-30 PROCEDURE — 99204 OFFICE O/P NEW MOD 45 MIN: CPT | Performed by: FAMILY MEDICINE

## 2024-09-30 RX ORDER — PREDNISONE 20 MG/1
40 TABLET ORAL DAILY
Qty: 10 TABLET | Refills: 0 | Status: SHIPPED | OUTPATIENT
Start: 2024-09-30 | End: 2024-10-05

## 2024-09-30 ASSESSMENT — PAIN SCALES - GENERAL: PAINLEVEL: MODERATE PAIN (5)

## 2024-09-30 NOTE — LETTER
9/30/2024      Petar Chapman  [address] [address]      Dear Colleague,    Thank you for referring your patient, Petar Chapman, to the [facility] [place]. Please see a copy of my visit note below.      HISTORY OF PRESENT ILLNESS  Mr. Chapman is a 54 year old male who presents to clinic today with left knee pain.  Petar has been experiencing pain in his left knee for the past 6 weeks or so.  He points to the medial anterior portion of his knee.  He feels popping whenever he extends his knee, this is pain relieving whenever it does occur.  He will also feel stiffness whenever he sits for long periods of time, this is not as prominent when he is moving around.        Additional history: as documented    MEDICAL HISTORY  Patient Active Problem List   Diagnosis     CARDIOVASCULAR SCREENING; LDL GOAL LESS THAN 160     Hearing loss     Family history of early CAD     Obesity     Shoulder pain, left       Current Outpatient Medications   Medication Sig Dispense Refill     acetaminophen (TYLENOL) 500 MG tablet Take 1,000 mg by mouth as needed       tadalafil (CIALIS) 5 MG tablet          Allergies   Allergen Reactions     Asa [Aspirin] Swelling     Ibuprofen Swelling     Face edematous with aspirin       Family History   Problem Relation Age of Onset     Sleep Apnea Mother      Heart Disease Father 32        mi non-smoker     Snoring Father      Cancer Maternal Grandmother      Heart Disease Paternal Grandmother        Additional medical/Social/Surgical histories reviewed in EPIC and updated as appropriate.        PHYSICAL EXAM  General  - normal appearance, in no obvious distress  Musculoskeletal - left knee  - stance: normal gait without limp  - inspection: no swelling or effusion, normal bone and joint alignment, no obvious deformity  - palpation: no joint line tenderness, patella and patellar tendon non-tender  - ROM: 135 degrees flexion, -5 degrees  extension, popping with extension on occasion  - strength: 5/5 in flexion, 5/5 in extension  - special tests:  (-) Lachman  (-) Virgilio  (-) varus at 0 and 30 degrees flexion  (-) valgus at 0 and 30 degrees flexion    Neuro  - no sensory or motor deficit, grossly normal coordination, normal muscle tone  Skin  - no ecchymosis, erythema, warmth, or induration, no obvious rash                   ASSESSMENT & PLAN  Mr. Chapman is a 54 year old male who presents to clinic today with left knee pain.    I ordered and independently reviewed an xray of his left knee, this reveals no obvious acute issues.    His symptoms do seem to be arising from his patellofemoral joint, possibly a fat pad impingement.  We discussed the pathophysiology of this as well as treatment strategies, I do think that he will do great with a course of physical therapy and anti-inflammatories.  I am prescribing him prednisone as he cannot take NSAIDs.    If his symptoms are not improving whatsoever I would likely recommend advanced imaging with MRI.  He is going to let me know if this is the case.    It was a pleasure seeing Petar today.    Yuriy Lo DO, [facility]  Primary Care Sports Medicine      This note was constructed using Dragon dictation software, please excuse any minor errors in spelling, grammar, or syntax.      Again, thank you for allowing me to participate in the care of your patient.        Sincerely,        Yuriy Lo DO

## 2024-09-30 NOTE — NURSING NOTE
Chief Complaint   Patient presents with    Left Knee - Pain, New Patient     Left knee injury about 6 weeks ago       There were no vitals filed for this visit.    There is no height or weight on file to calculate BMI.      ROBSON Carrillo NREMT

## 2024-09-30 NOTE — PROGRESS NOTES
HISTORY OF PRESENT ILLNESS  Mr. Chapman is a 54 year old male who presents to clinic today with left knee pain.  Petar has been experiencing pain in his left knee for the past 6 weeks or so.  He points to the medial anterior portion of his knee.  He feels popping whenever he extends his knee, this is pain relieving whenever it does occur.  He will also feel stiffness whenever he sits for long periods of time, this is not as prominent when he is moving around.        Additional history: as documented    MEDICAL HISTORY  Patient Active Problem List   Diagnosis    CARDIOVASCULAR SCREENING; LDL GOAL LESS THAN 160    Hearing loss    Family history of early CAD    Obesity    Shoulder pain, left       Current Outpatient Medications   Medication Sig Dispense Refill    acetaminophen (TYLENOL) 500 MG tablet Take 1,000 mg by mouth as needed      tadalafil (CIALIS) 5 MG tablet          Allergies   Allergen Reactions    Asa [Aspirin] Swelling    Ibuprofen Swelling     Face edematous with aspirin       Family History   Problem Relation Age of Onset    Sleep Apnea Mother     Heart Disease Father 32        mi non-smoker    Snoring Father     Cancer Maternal Grandmother     Heart Disease Paternal Grandmother        Additional medical/Social/Surgical histories reviewed in EPIC and updated as appropriate.        PHYSICAL EXAM  General  - normal appearance, in no obvious distress  Musculoskeletal - left knee  - stance: normal gait without limp  - inspection: no swelling or effusion, normal bone and joint alignment, no obvious deformity  - palpation: no joint line tenderness, patella and patellar tendon non-tender  - ROM: 135 degrees flexion, -5 degrees extension, popping with extension on occasion  - strength: 5/5 in flexion, 5/5 in extension  - special tests:  (-) Lachman  (-) Virgilio  (-) varus at 0 and 30 degrees flexion  (-) valgus at 0 and 30 degrees flexion    Neuro  - no sensory or motor deficit, grossly normal  coordination, normal muscle tone  Skin  - no ecchymosis, erythema, warmth, or induration, no obvious rash                   ASSESSMENT & PLAN  Mr. Chapman is a 54 year old male who presents to clinic today with left knee pain.    I ordered and independently reviewed an xray of his left knee, this reveals no obvious acute issues.    His symptoms do seem to be arising from his patellofemoral joint, possibly a fat pad impingement.  We discussed the pathophysiology of this as well as treatment strategies, I do think that he will do great with a course of physical therapy and anti-inflammatories.  I am prescribing him prednisone as he cannot take NSAIDs.    If his symptoms are not improving whatsoever I would likely recommend advanced imaging with MRI.  He is going to let me know if this is the case.    It was a pleasure seeing Petar today.    Yuriy Lo DO, [facility]  Primary Care Sports Medicine      This note was constructed using Dragon dictation software, please excuse any minor errors in spelling, grammar, or syntax.

## 2024-10-04 ENCOUNTER — THERAPY VISIT (OUTPATIENT)
Dept: PHYSICAL THERAPY | Facility: CLINIC | Age: 54
End: 2024-10-04
Attending: FAMILY MEDICINE
Payer: COMMERCIAL

## 2024-10-04 DIAGNOSIS — M25.562 ACUTE PAIN OF LEFT KNEE: ICD-10-CM

## 2024-10-04 PROCEDURE — 97161 PT EVAL LOW COMPLEX 20 MIN: CPT | Mod: GP

## 2024-10-04 PROCEDURE — 97110 THERAPEUTIC EXERCISES: CPT | Mod: GP

## 2024-10-04 ASSESSMENT — ACTIVITIES OF DAILY LIVING (ADL)
HOW_WOULD_YOU_RATE_THE_OVERALL_FUNCTION_OF_YOUR_KNEE_DURING_YOUR_USUAL_DAILY_ACTIVITIES?: ABNORMAL
HOW_WOULD_YOU_RATE_THE_OVERALL_FUNCTION_OF_YOUR_KNEE_DURING_YOUR_USUAL_DAILY_ACTIVITIES?: ABNORMAL
PLEASE_INDICATE_YOR_PRIMARY_REASON_FOR_REFERRAL_TO_THERAPY:: KNEE
KNEE_ACTIVITY_OF_DAILY_LIVING_SUM: 3
PAIN: THE SYMPTOM AFFECTS MY ACTIVITY SLIGHTLY

## 2024-10-04 NOTE — PROGRESS NOTES
PHYSICAL THERAPY EVALUATION  Type of Visit: Evaluation              Subjective     Started 6 weeks ago [place] [employer] and when batting, knee twisted and felt it the next day. Felt no crunch or pop. Drives a lot and the knee gets stiff and painful (after 5 minutes). More in the inside   Presenting condition or subjective complaint: knee pain lack of movement  Date of onset: 09/30/24 (date of doc referral)    Relevant medical history:     Dates & types of surgery: 2022 prostate    Prior diagnostic imaging/testing results: X-ray     Prior therapy history for the same diagnosis, illness or injury:        Prior Level of Function  Transfers: Independent  Ambulation: Independent  ADL: Independent    Living Environment  Social support: With family members   Type of home: House   Stairs to enter the home:         Ramp: No   Stairs inside the home: Yes 16 Is there a railing: Yes     Help at home: None  Equipment owned:       Employment:      Hobbies/Interests:      Patient goals for therapy: kneel on the ground       Objective   KNEE EVALUATION  PAIN: Pain Level at Rest: 2/10  Pain Level with Use: 6/10  Pain Location: knee  Pain Quality: Aching, Dull, and Sharp  Pain Frequency: intermittent  Pain is Worst: used to wake up at night but now doing okay   Pain is Exacerbated By: kneeling, walking  Pain is Relieved By: icy hot helps back side of the knee  Pain Progression: Worsened  INTEGUMENTARY (edema, incisions): WNL  GAIT:  Weightbearing Status: WBAT  Assistive Device(s): None  Gait Deviations: Antalgic    ROM:  L knee flexion 133, 0 degrees knee extension     STRENGTH: WNL  5/5 knee ext, flex, DF, PF, hip flex bilaterally    FLEXIBILITY: WFL  SPECIAL TESTS:  (-) varus and valgus test  FUNCTIONAL TESTS: Double Leg Squat: Anterior knee translation, Knee valgus, Hip internal rotation, Quadriceps avoidance squatting pattern, and Improper use of glutes/hips  Single Leg Squat: Anterior knee translation, Knee valgus, Hip  internal rotation, Quadriceps avoidance squatting pattern, and Improper use of glutes/hips  PALPATION:  superior medial patellar facet   JOINT MOBILITY:  hypomobile patella (sup/inf, medial/lateral)    Assessment & Plan   CLINICAL IMPRESSIONS  Medical Diagnosis: Acute pain of L knee    Treatment Diagnosis: Acute pain of L knee   Impression/Assessment: Patient is a 54 year old male with acute L knee pain complaints.  The following significant findings have been identified: Pain, Decreased ROM/flexibility, Decreased strength, Impaired muscle performance, and Decreased activity tolerance. These impairments interfere with their ability to perform self care tasks, work tasks, recreational activities, household chores, driving , household mobility, and community mobility as compared to previous level of function.     Clinical Decision Making (Complexity):  Clinical Presentation: Stable/Uncomplicated  Clinical Presentation Rationale: based on medical and personal factors listed in PT evaluation  Clinical Decision Making (Complexity): Low complexity    PLAN OF CARE  Treatment Interventions:  Modalities: Cryotherapy, Dry Needling, E-stim  Interventions: Gait Training, Manual Therapy, Neuromuscular Re-education, Therapeutic Activity, Therapeutic Exercise    Long Term Goals     PT Goal 1  Goal Identifier: Driving  Goal Description: Pt will be able to drive for 60 minutes with knee in 90 degrees of flexion and 0/10 pain  Rationale: to maximize safety and independence with performance of ADLs and functional tasks;to maximize safety and independence within the home;to maximize safety and independence within the community;to maximize safety and independence with transportation;to maximize safety and independence with self cares      Frequency of Treatment: 1x a week for 4 weeks then bimonthly for 4 weeks  Duration of Treatment: 9-12 weeks    Recommended Referrals to Other Professionals:   Education Assessment:   Learner/Method:  Patient;Reading;Listening;Demonstration;Pictures/Video;No Barriers to Learning    Risks and benefits of evaluation/treatment have been explained.   Patient/Family/caregiver agrees with Plan of Care.     Evaluation Time:     PT Eval, Low Complexity Minutes (24050): 16       Signing Clinician: Yahir Bettencourt PT

## 2024-10-07 ENCOUNTER — OFFICE VISIT (OUTPATIENT)
Dept: FAMILY MEDICINE | Facility: CLINIC | Age: 54
End: 2024-10-07
Payer: COMMERCIAL

## 2024-10-07 VITALS
WEIGHT: 254 LBS | SYSTOLIC BLOOD PRESSURE: 128 MMHG | HEART RATE: 82 BPM | DIASTOLIC BLOOD PRESSURE: 94 MMHG | TEMPERATURE: 96.9 F | BODY MASS INDEX: 37.62 KG/M2 | OXYGEN SATURATION: 97 % | RESPIRATION RATE: 14 BRPM | HEIGHT: 69 IN

## 2024-10-07 DIAGNOSIS — E11.9 TYPE 2 DIABETES MELLITUS WITHOUT COMPLICATION, WITHOUT LONG-TERM CURRENT USE OF INSULIN (H): ICD-10-CM

## 2024-10-07 DIAGNOSIS — Z85.46 HISTORY OF PROSTATE CANCER: ICD-10-CM

## 2024-10-07 DIAGNOSIS — Z00.00 ROUTINE GENERAL MEDICAL EXAMINATION AT A HEALTH CARE FACILITY: Primary | ICD-10-CM

## 2024-10-07 DIAGNOSIS — Z23 NEED FOR PROPHYLACTIC VACCINATION AND INOCULATION AGAINST INFLUENZA: ICD-10-CM

## 2024-10-07 LAB
ALBUMIN SERPL BCG-MCNC: 4.5 G/DL (ref 3.5–5.2)
ALP SERPL-CCNC: 122 U/L (ref 40–150)
ALT SERPL W P-5'-P-CCNC: 91 U/L (ref 0–70)
ANION GAP SERPL CALCULATED.3IONS-SCNC: 10 MMOL/L (ref 7–15)
AST SERPL W P-5'-P-CCNC: 42 U/L (ref 0–45)
BASOPHILS # BLD AUTO: 0 10E3/UL (ref 0–0.2)
BASOPHILS NFR BLD AUTO: 1 %
BILIRUB SERPL-MCNC: 0.6 MG/DL
BUN SERPL-MCNC: 24.6 MG/DL (ref 6–20)
CALCIUM SERPL-MCNC: 9.2 MG/DL (ref 8.8–10.4)
CHLORIDE SERPL-SCNC: 105 MMOL/L (ref 98–107)
CHOLEST SERPL-MCNC: 202 MG/DL
CREAT SERPL-MCNC: 1.04 MG/DL (ref 0.67–1.17)
EGFRCR SERPLBLD CKD-EPI 2021: 86 ML/MIN/1.73M2
EOSINOPHIL # BLD AUTO: 0.1 10E3/UL (ref 0–0.7)
EOSINOPHIL NFR BLD AUTO: 2 %
ERYTHROCYTE [DISTWIDTH] IN BLOOD BY AUTOMATED COUNT: 12.6 % (ref 10–15)
EST. AVERAGE GLUCOSE BLD GHB EST-MCNC: 157 MG/DL
FASTING STATUS PATIENT QL REPORTED: YES
FASTING STATUS PATIENT QL REPORTED: YES
GLUCOSE SERPL-MCNC: 161 MG/DL (ref 70–99)
HBA1C MFR BLD: 7.1 % (ref 0–5.6)
HCO3 SERPL-SCNC: 27 MMOL/L (ref 22–29)
HCT VFR BLD AUTO: 49 % (ref 40–53)
HDLC SERPL-MCNC: 47 MG/DL
HGB BLD-MCNC: 16.2 G/DL (ref 13.3–17.7)
IMM GRANULOCYTES # BLD: 0 10E3/UL
IMM GRANULOCYTES NFR BLD: 0 %
LDLC SERPL CALC-MCNC: 128 MG/DL
LYMPHOCYTES # BLD AUTO: 2.3 10E3/UL (ref 0.8–5.3)
LYMPHOCYTES NFR BLD AUTO: 44 %
MCH RBC QN AUTO: 29.1 PG (ref 26.5–33)
MCHC RBC AUTO-ENTMCNC: 33.1 G/DL (ref 31.5–36.5)
MCV RBC AUTO: 88 FL (ref 78–100)
MONOCYTES # BLD AUTO: 0.5 10E3/UL (ref 0–1.3)
MONOCYTES NFR BLD AUTO: 9 %
NEUTROPHILS # BLD AUTO: 2.4 10E3/UL (ref 1.6–8.3)
NEUTROPHILS NFR BLD AUTO: 45 %
NONHDLC SERPL-MCNC: 155 MG/DL
PLATELET # BLD AUTO: 200 10E3/UL (ref 150–450)
POTASSIUM SERPL-SCNC: 4.7 MMOL/L (ref 3.4–5.3)
PROT SERPL-MCNC: 7.3 G/DL (ref 6.4–8.3)
PSA SERPL DL<=0.01 NG/ML-MCNC: <0.01 NG/ML (ref 0–3.5)
RBC # BLD AUTO: 5.57 10E6/UL (ref 4.4–5.9)
SODIUM SERPL-SCNC: 142 MMOL/L (ref 135–145)
TRIGL SERPL-MCNC: 136 MG/DL
WBC # BLD AUTO: 5.3 10E3/UL (ref 4–11)

## 2024-10-07 PROCEDURE — 80061 LIPID PANEL: CPT | Performed by: FAMILY MEDICINE

## 2024-10-07 PROCEDURE — 83036 HEMOGLOBIN GLYCOSYLATED A1C: CPT | Performed by: FAMILY MEDICINE

## 2024-10-07 PROCEDURE — 85025 COMPLETE CBC W/AUTO DIFF WBC: CPT | Performed by: FAMILY MEDICINE

## 2024-10-07 PROCEDURE — 90471 IMMUNIZATION ADMIN: CPT | Performed by: FAMILY MEDICINE

## 2024-10-07 PROCEDURE — 80053 COMPREHEN METABOLIC PANEL: CPT | Performed by: FAMILY MEDICINE

## 2024-10-07 PROCEDURE — 84153 ASSAY OF PSA TOTAL: CPT | Performed by: FAMILY MEDICINE

## 2024-10-07 PROCEDURE — 36415 COLL VENOUS BLD VENIPUNCTURE: CPT | Performed by: FAMILY MEDICINE

## 2024-10-07 PROCEDURE — 90656 IIV3 VACC NO PRSV 0.5 ML IM: CPT | Performed by: FAMILY MEDICINE

## 2024-10-07 PROCEDURE — 99396 PREV VISIT EST AGE 40-64: CPT | Mod: 25 | Performed by: FAMILY MEDICINE

## 2024-10-07 SDOH — HEALTH STABILITY: PHYSICAL HEALTH: ON AVERAGE, HOW MANY MINUTES DO YOU ENGAGE IN EXERCISE AT THIS LEVEL?: 0 MIN

## 2024-10-07 SDOH — HEALTH STABILITY: PHYSICAL HEALTH: ON AVERAGE, HOW MANY DAYS PER WEEK DO YOU ENGAGE IN MODERATE TO STRENUOUS EXERCISE (LIKE A BRISK WALK)?: 0 DAYS

## 2024-10-07 ASSESSMENT — PAIN SCALES - GENERAL: PAINLEVEL: NO PAIN (0)

## 2024-10-07 ASSESSMENT — SOCIAL DETERMINANTS OF HEALTH (SDOH): HOW OFTEN DO YOU GET TOGETHER WITH FRIENDS OR RELATIVES?: ONCE A WEEK

## 2024-10-07 NOTE — PATIENT INSTRUCTIONS
Patient Education   Preventive Care Advice   This is general advice given by our system to help you stay healthy. However, your care team may have specific advice just for you. Please talk to your care team about your preventive care needs.  Nutrition  Eat 5 or more servings of fruits and vegetables each day.  Try wheat bread, brown rice and whole grain pasta (instead of white bread, rice, and pasta).  Get enough calcium and vitamin D. Check the label on foods and aim for 100% of the RDA (recommended daily allowance).  Lifestyle  Exercise at least 150 minutes each week  (30 minutes a day, 5 days a week).  Do muscle strengthening activities 2 days a week. These help control your weight and prevent disease.  No smoking.  Wear sunscreen to prevent skin cancer.  Have a dental exam and cleaning every 6 months.  Yearly exams  See your health care team every year to talk about:  Any changes in your health.  Any medicines your care team has prescribed.  Preventive care, family planning, and ways to prevent chronic diseases.  Shots (vaccines)   HPV shots (up to age 26), if you've never had them before.  Hepatitis B shots (up to age 59), if you've never had them before.  COVID-19 shot: Get this shot when it's due.  Flu shot: Get a flu shot every year.  Tetanus shot: Get a tetanus shot every 10 years.  Pneumococcal, hepatitis A, and RSV shots: Ask your care team if you need these based on your risk.  Shingles shot (for age 50 and up)  General health tests  Diabetes screening:  Starting at age 35, Get screened for diabetes at least every 3 years.  If you are younger than age 35, ask your care team if you should be screened for diabetes.  Cholesterol test: At age 39, start having a cholesterol test every 5 years, or more often if advised.  Bone density scan (DEXA): At age 50, ask your care team if you should have this scan for osteoporosis (brittle bones).  Hepatitis C: Get tested at least once in your life.  STIs (sexually  transmitted infections)  Before age 24: Ask your care team if you should be screened for STIs.  After age 24: Get screened for STIs if you're at risk. You are at risk for STIs (including HIV) if:  You are sexually active with more than one person.  You don't use condoms every time.  You or a partner was diagnosed with a sexually transmitted infection.  If you are at risk for HIV, ask about PrEP medicine to prevent HIV.  Get tested for HIV at least once in your life, whether you are at risk for HIV or not.  Cancer screening tests  Cervical cancer screening: If you have a cervix, begin getting regular cervical cancer screening tests starting at age 21.  Breast cancer scan (mammogram): If you've ever had breasts, begin having regular mammograms starting at age 40. This is a scan to check for breast cancer.  Colon cancer screening: It is important to start screening for colon cancer at age 45.  Have a colonoscopy test every 10 years (or more often if you're at risk) Or, ask your provider about stool tests like a FIT test every year or Cologuard test every 3 years.  To learn more about your testing options, visit:   .  For help making a decision, visit:   https://bit.ly/le45559.  Prostate cancer screening test: If you have a prostate, ask your care team if a prostate cancer screening test (PSA) at age 55 is right for you.  Lung cancer screening: If you are a current or former smoker ages 50 to 80, ask your care team if ongoing lung cancer screenings are right for you.  For informational purposes only. Not to replace the advice of your health care provider. Copyright   2023 [employer]. All rights reserved. Clinically reviewed by the [facility]. voxapp 572065 - REV 01/24.
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
## 2024-10-09 RX ORDER — METFORMIN HYDROCHLORIDE 500 MG/1
1000 TABLET, EXTENDED RELEASE ORAL 2 TIMES DAILY WITH MEALS
Qty: 360 TABLET | Refills: 3 | Status: SHIPPED | OUTPATIENT
Start: 2024-10-09

## 2024-10-09 RX ORDER — LANCETS
EACH MISCELLANEOUS
Qty: 100 EACH | Refills: 11 | Status: SHIPPED | OUTPATIENT
Start: 2024-10-09

## 2024-10-14 ENCOUNTER — THERAPY VISIT (OUTPATIENT)
Dept: PHYSICAL THERAPY | Facility: CLINIC | Age: 54
End: 2024-10-14
Payer: COMMERCIAL

## 2024-10-14 DIAGNOSIS — M25.562 ACUTE PAIN OF LEFT KNEE: Primary | ICD-10-CM

## 2024-10-14 PROCEDURE — 97110 THERAPEUTIC EXERCISES: CPT | Mod: GP

## 2024-11-01 ENCOUNTER — OFFICE VISIT (OUTPATIENT)
Dept: FAMILY MEDICINE | Facility: CLINIC | Age: 54
End: 2024-11-01
Attending: FAMILY MEDICINE
Payer: COMMERCIAL

## 2024-11-01 ENCOUNTER — LAB (OUTPATIENT)
Dept: LAB | Facility: CLINIC | Age: 54
End: 2024-11-01
Payer: COMMERCIAL

## 2024-11-01 VITALS
DIASTOLIC BLOOD PRESSURE: 86 MMHG | HEIGHT: 70 IN | WEIGHT: 249.8 LBS | HEART RATE: 74 BPM | RESPIRATION RATE: 16 BRPM | SYSTOLIC BLOOD PRESSURE: 126 MMHG | BODY MASS INDEX: 35.76 KG/M2 | OXYGEN SATURATION: 97 % | TEMPERATURE: 97.8 F

## 2024-11-01 DIAGNOSIS — E11.9 TYPE 2 DIABETES MELLITUS WITHOUT COMPLICATION, WITHOUT LONG-TERM CURRENT USE OF INSULIN (H): ICD-10-CM

## 2024-11-01 DIAGNOSIS — E78.5 HYPERLIPIDEMIA LDL GOAL <100: ICD-10-CM

## 2024-11-01 DIAGNOSIS — E11.9 TYPE 2 DIABETES MELLITUS WITHOUT COMPLICATION, WITHOUT LONG-TERM CURRENT USE OF INSULIN (H): Primary | ICD-10-CM

## 2024-11-01 DIAGNOSIS — C61 MALIGNANT NEOPLASM OF PROSTATE (H): ICD-10-CM

## 2024-11-01 PROCEDURE — 82570 ASSAY OF URINE CREATININE: CPT

## 2024-11-01 PROCEDURE — 82043 UR ALBUMIN QUANTITATIVE: CPT

## 2024-11-01 PROCEDURE — G2211 COMPLEX E/M VISIT ADD ON: HCPCS | Performed by: FAMILY MEDICINE

## 2024-11-01 PROCEDURE — 99214 OFFICE O/P EST MOD 30 MIN: CPT | Performed by: FAMILY MEDICINE

## 2024-11-01 RX ORDER — ROSUVASTATIN CALCIUM 20 MG/1
20 TABLET, COATED ORAL DAILY
Qty: 90 TABLET | Refills: 3 | Status: SHIPPED | OUTPATIENT
Start: 2024-11-01

## 2024-11-01 ASSESSMENT — PAIN SCALES - GENERAL: PAINLEVEL_OUTOF10: NO PAIN (0)

## 2024-11-01 NOTE — PROGRESS NOTES
{PROVIDER CHARTING PREFERENCE:583387}    Rolo Davey is a 54 year old, presenting for the following health issues:  Follow Up      11/1/2024     6:51 AM   Additional Questions   Roomed by Bloa JOHNS LPN   Accompanied by Self     HPI     {MA/LPN/RN Pre-Provider Visit Orders- hCG/UA/Strep (Optional):350715}  {SUPERLIST (Optional):737264}      {ROS Picklists (Optional):822821}      Objective    There were no vitals taken for this visit.  There is no height or weight on file to calculate BMI.  Physical Exam   {Exam List (Optional):072193}    {Diagnostic Test Results (Optional):869340}        Signed Electronically by: Mazin Ramires MD  {Email feedback regarding this note to [email or web address]   :[id number]}

## 2024-11-01 NOTE — PROGRESS NOTES
Assessment & Plan     Type 2 diabetes mellitus without complication, without long-term current use of insulin (H)   I had a long discussion with the patient about his concerns. New diagnosis   Current medications: Metformin 1000 mg BID -he reports nocturia since starting metformin  I recommend the following :  Patient education: In depth discussion and education was provided about the assessment and implications of each of the below recommendations for management. Patient indicated readiness to learn, all questions were answered and understanding of material presented was confirmed.  -Increase exercise as tolerated.   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  Therapy/equipment/braces: None  Medications: Continue metformin, consider GLP-1 to help with weight loss as well   referral / follow up with other providers: Eye doctor  Follow up: 6 months    - Albumin Random Urine Quantitative with Creat Ratio; Future  - rosuvastatin (CRESTOR) 20 MG tablet; Take 1 tablet (20 mg) by mouth daily.  - Adult Diabetes Education  Referral; Future    Foot exam done and was normal   Hyperlipidemia LDL goal <100  Last LDL elevated at 128, triglycerides and HDL were normal  Recommended starting Crestor due to recent diagnosis of diabetes.  Continue Crestor  -     rosuvastatin (CRESTOR) 20 MG tablet; Take 1 tablet (20 mg) by mouth daily.  Malignant neoplasm of prostate (H)  Other orders  -     PRIMARY CARE FOLLOW-UP SCHEDULING      History of breast cancer  Last PSA undetectable  Continue to monitor  Work on weight loss  Regular exercise    Rolo Davey is a 54 year old, presenting for the following health issues:  Follow Up      11/1/2024      "7:43 AM   Additional Questions   Roomed by TROY HERNANDEZ   Accompanied by SELF     History of Present Illness       Diabetes:   He presents for follow up of diabetes.  He is checking home blood glucose two times daily.   He checks blood glucose before meals.  Blood glucose is never over 200 and never under 70.  When his blood glucose is low, the patient is asymptomatic for confusion, blurred vision, lethargy and reports not feeling dizzy, shaky, or weak.  He is concerned about other.   He is having excessive thirst.  The patient has not had a diabetic eye exam in the last 12 months.            Wt Readings from Last 4 Encounters:   11/01/24 113.3 kg (249 lb 12.8 oz)   10/07/24 115.2 kg (254 lb)   11/08/22 108.9 kg (240 lb)   07/21/22 113 kg (249 lb 1.6 oz)                 Review of Systems  Constitutional, HEENT, cardiovascular, pulmonary, gi and gu systems are negative, except as otherwise noted.      Objective    /86   Pulse 74   Temp 97.8  F (36.6  C) (Tympanic)   Resp 16   Ht 1.778 m (5' 10\")   Wt 113.3 kg (249 lb 12.8 oz)   SpO2 97%   BMI 35.84 kg/m    Body mass index is 35.84 kg/m .  Physical Exam  Vitals and nursing note reviewed.   Constitutional:       General: He is not in acute distress.     Appearance: Normal appearance. He is obese. He is not toxic-appearing or diaphoretic.   Cardiovascular:      Pulses:           Dorsalis pedis pulses are 2+ on the right side and 2+ on the left side.        Posterior tibial pulses are 2+ on the right side and 2+ on the left side.   Musculoskeletal:      Right foot: Normal range of motion. No deformity, bunion, Charcot foot, foot drop or prominent metatarsal heads.      Left foot: Normal range of motion. No deformity, bunion, Charcot foot, foot drop or prominent metatarsal heads.   Feet:      Right foot:      Protective Sensation: 8 sites tested.  8 sites sensed.      Skin integrity: No ulcer, blister, skin breakdown, erythema, warmth, callus, dry skin or " fissure.      Left foot:      Protective Sensation: 8 sites tested.  8 sites sensed.      Skin integrity: No ulcer, blister, skin breakdown, erythema, warmth, callus, dry skin or fissure.   Neurological:      Mental Status: He is alert.                    Signed Electronically by: Mazin Ramires MD

## 2024-11-02 LAB
CREAT UR-MCNC: 141 MG/DL
MICROALBUMIN UR-MCNC: <12 MG/L
MICROALBUMIN/CREAT UR: NORMAL MG/G{CREAT}

## 2024-11-06 ENCOUNTER — THERAPY VISIT (OUTPATIENT)
Dept: PHYSICAL THERAPY | Facility: CLINIC | Age: 54
End: 2024-11-06
Payer: COMMERCIAL

## 2024-11-06 DIAGNOSIS — M25.562 LEFT KNEE PAIN: Primary | ICD-10-CM

## 2024-11-06 PROCEDURE — 97110 THERAPEUTIC EXERCISES: CPT | Mod: GP | Performed by: PHYSICAL THERAPIST

## 2024-11-27 ENCOUNTER — THERAPY VISIT (OUTPATIENT)
Dept: PHYSICAL THERAPY | Facility: CLINIC | Age: 54
End: 2024-11-27
Payer: COMMERCIAL

## 2024-11-27 DIAGNOSIS — M25.562 LEFT KNEE PAIN: Primary | ICD-10-CM

## 2024-11-27 PROCEDURE — 97110 THERAPEUTIC EXERCISES: CPT | Mod: GP | Performed by: PHYSICAL THERAPIST

## 2024-11-29 ENCOUNTER — ANCILLARY PROCEDURE (OUTPATIENT)
Dept: MRI IMAGING | Facility: CLINIC | Age: 54
End: 2024-11-29
Attending: FAMILY MEDICINE
Payer: COMMERCIAL

## 2024-11-29 DIAGNOSIS — S83.242A ACUTE MEDIAL MENISCUS TEAR OF LEFT KNEE, INITIAL ENCOUNTER: ICD-10-CM

## 2024-11-29 PROCEDURE — 73721 MRI JNT OF LWR EXTRE W/O DYE: CPT | Mod: LT | Performed by: RADIOLOGY

## 2024-12-03 ENCOUNTER — ALLIED HEALTH/NURSE VISIT (OUTPATIENT)
Dept: EDUCATION SERVICES | Facility: CLINIC | Age: 54
End: 2024-12-03
Attending: FAMILY MEDICINE
Payer: COMMERCIAL

## 2024-12-03 DIAGNOSIS — E11.9 TYPE 2 DIABETES MELLITUS WITHOUT COMPLICATION, WITHOUT LONG-TERM CURRENT USE OF INSULIN (H): ICD-10-CM

## 2024-12-03 NOTE — PATIENT INSTRUCTIONS
Diabetes Support Resources:  Metformin  mg take 2 pills twice per day with a meal or all 4 at one time  Will discuss again Mounjaro to start  3 meals per day with 45-75 gms of carbs + Proteins.   Snacks 2-3 per day having 1-2 carb choice + proteins      Bring blood glucose meter and logbook with you to all doctor and follow-up appointments.    Diabetes Education Telephone Visit Follow-up:    We realize your time is valuable and your health is important! We offer a convenient Telephone Visit follow up! It s a quick way to check in for a medication dose adjustment without having to come back to clinic as soon.    Telephone Visits are often covered by insurance. Please check with your insurance plan to see if this type of visit is covered. If not, the cost is less expensive than an office visit:    Up to 10 minutes (Code 98139): $30  11-20 minutes (Code 36424): $59  More than 20 minutes (Code 03955): $85    Talk with your Diabetes Educator if you want to learn more.      [facility]:  For Your Diabetes Education and Nutrition Appointments Call:  [phone]   For Diabetes Education or Nutrition Related Questions:   Phone: [phone]  Send [employer] Message   If you need a medication refill please contact your pharmacy. Please allow 3 business days for your refills to be completed.

## 2024-12-03 NOTE — LETTER
12/3/2024         RE: Petar Chapman  [id number] [facility] [address]        Dear Colleague,    Thank you for referring your patient, Petar Chapman, to the [facility]. Please see a copy of my visit note below.    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 he does not have consistent meal planning he kept saying that some of the lifestyle changes he needs to make may not happen he eats on the road quite a bit just started his metformin does not like to take medications I discussed with him GLP-1's and SGLT2's with him and the role that they would play in his diabetes management he needs to be checking his blood sugars twice a day he did not bring his meter in today we did discuss sensors that he could wear he will check in to see if he can afford this discussed the value of making lifestyle changes now while his diabetes is not severely out-of-control    Patient's most recent   Lab Results   Component Value Date    A1C 7.1 10/07/2024    A1C 5.7 07/23/2018     is not meeting goal of <7.0    Diabetes knowledge and skills assessment:   Patient is knowledgeable in diabetes management concepts related to: Taking Medication and Healthy Coping    Continue education with the following diabetes management concepts: Healthy Eating, Being Active, Monitoring, Taking Medication, Problem Solving, and Reducing Risks    Based on learning assessment above, most appropriate setting for further diabetes education would be: Individual setting.      PLAN  Metformin  mg take 2 pills twice per day with a meal or all 4 at one time  Will discuss again Mounjaro to start  3 meals per day with 45-75 gms of carbs + Proteins.   Snacks 2-3 per day having 1-2 carb choice + proteins     Topics to cover at  "upcoming visits: Healthy Eating, Being Active, Monitoring, Taking Medication, and Problem Solving    Follow-up:     See Care Plan for co-developed, patient-state behavior change goals.  AVS provided for patient today.    Education Materials Provided:   [employer] Understanding Diabetes Booklet, Carbohydrate Counting, Medication Information on Metformin, and My Plate Planner      SUBJECTIVE/OBJECTIVE:  Presents for: Initial Assessment for new diagnosis  Accompanied by: Self  Diabetes education in the past 24mo: No  Focus of Visit: Diabetes Pathophysiology, Assistance w/ making life changes, Healthy Eating, Taking Medication, Monitoring, Being Active  Diabetes type: Type 2  Date of diagnosis: 2024  Disease course: Stable  How confident are you filling out medical forms by yourself:: Extremely  Transportation concerns: No  Difficulty affording diabetes medication?: No  Difficulty affording diabetes testing supplies?: No  Other concerns:: None  Cultural Influences/Ethnic Background:  Not  or       Diabetes Symptoms & Complications:  Diabetes Related Symptoms: Fatigue, Polydipsia (increased thirst), Polyuria (increased urination)  Weight trend: Decreasing  Symptom course: Stable  Disease course: Stable       Patient Problem List and Family Medical History reviewed for relevant medical history, current medical status, and diabetes risk factors.    Vitals:  There were no vitals taken for this visit.  Estimated body mass index is 35.84 kg/m  as calculated from the following:    Height as of 11/1/24: 1.778 m (5' 10\").    Weight as of 11/1/24: 113.3 kg (249 lb 12.8 oz).   Last 3 BP:   BP Readings from Last 3 Encounters:   11/01/24 126/86   10/07/24 (!) 128/94   07/21/22 123/88       History   Smoking Status     Former     Types: Cigarettes   Smokeless Tobacco     Former     Quit date: 3/15/1995       Labs:  Lab Results   Component Value Date    A1C 7.1 10/07/2024    A1C 5.7 07/23/2018     Lab Results " "  Component Value Date     10/07/2024     11/01/2021     07/17/2018     Lab Results   Component Value Date     10/07/2024     07/17/2018     HDL Cholesterol   Date Value Ref Range Status   07/17/2018 42 >39 mg/dL Final     Direct Measure HDL   Date Value Ref Range Status   10/07/2024 47 >=40 mg/dL Final   ]  GFR Estimate   Date Value Ref Range Status   10/07/2024 86 >60 mL/min/1.73m2 Final     Comment:     eGFR calculated using 2021 CKD-EPI equation.   06/29/2015 69 >60 mL/min/1.7m2 Final     Comment:     Non  GFR Calc     GFR Estimate If Black   Date Value Ref Range Status   06/29/2015 84 >60 mL/min/1.7m2 Final     Comment:      GFR Calc     Lab Results   Component Value Date    CR 1.04 10/07/2024    CR 1.15 06/29/2015     No results found for: \"MICROALBUMIN\"    Healthy Eating:  Healthy Eating Assessed Today: Yes  Cultural/Hoahaoism diet restrictions?: No  Do you have any food allergies or intolerances?: No  Meal planning/habits: None  Who cooks/prepares meals for you?: Self, Spouse  Who purchases food in  your home?: Self, Spouse  How many times a week on average do you eat food made away from home (restaurant/take-out)?: 5+  Breakfast: eggs X 3 per wk. omlets  Lunch: home left overs, if on the road, fast foods  Dinner: turkey, mashed potatoes, aspargus, stuffing  Snacks: apples or nuts  Beverages: Water, Coffee, Milk  Has patient met with a dietitian in the past?: No    Being Active:  Being Active Assessed Today: Yes  Exercise:: Currently not exercising    Monitoring:  Monitoring Assessed Today: Yes  Did patient bring glucose meter to appointment? : No  Blood Glucose Meter: Accu-chek  Times checking blood sugar at home (number): 2  Times checking blood sugar at home (per): Day    Did not bring in his glucose meter to look at his numbers    Taking Medications:  Diabetes Medication(s)       Biguanides       metFORMIN (GLUCOPHAGE XR) 500 MG 24 hr " tablet Take 2 tablets (1,000 mg) by mouth 2 times daily (with meals).            Taking Medication Assessed Today: Yes  Current Treatments: Oral Medication (taken by mouth)  Problems taking diabetes medications regularly?: No  Diabetes medication side effects?: No    Problem Solving:                 Reducing Risks:       Healthy Coping:     Patient Activation Measure Survey Score:      6/1/2012     3:00 PM 6/18/2020     9:00 AM   ANNIE Score (Last Two)   ANNIE Raw Score 44 40   Activation Score 70.8 100   ANNIE Level 4 4         Care Plan and Education Provided:  Healthy Eating: Balanced meals, Carbohydrate Counting, Consistency in amount and timing of carbohydrate intake, Label reading, Plate planning method, and Portion control, Being Active: Amount recommended (150 minutes moderate or 75 minutes vigorous activity and 2-3 days strength training per week) and Finding a physical activity routine that works for you, Monitoring: Blood glucose versus Continuous Glucose Monitoring, Frequency of monitoring, Individual glucose targets, Log and interpret results, Purpose, and Proper technique, Taking Medication: Action of prescribed medication(s), Side effects of prescribed medication(s), and When to take medication(s), and Problem Solving: High glucose - causes, signs/symptoms, treatment and prevention, Low glucose - causes, signs/symptoms, treatment and prevention, Rule of 15 and carrying a carbohydrate source at all times in case of low glucose, Safe travel, Sick day arrangements, and When to call a health care provider    Jyoti Mauro RN/CHRISSIE [place] Diabetes Educator      Time Spent: 90 minutes  Encounter Type: Individual    Any diabetes medication dose changes were made via the [facility] Protocol per the patient's primary care provider. A copy of this encounter was shared with the provider.

## 2024-12-04 PROBLEM — E11.9 TYPE 2 DIABETES MELLITUS WITHOUT COMPLICATION, WITHOUT LONG-TERM CURRENT USE OF INSULIN (H): Status: ACTIVE | Noted: 2024-12-04

## 2024-12-04 NOTE — PROGRESS NOTES
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 he does not have consistent meal planning he kept saying that some of the lifestyle changes he needs to make may not happen he eats on the road quite a bit just started his metformin does not like to take medications I discussed with him GLP-1's and SGLT2's with him and the role that they would play in his diabetes management he needs to be checking his blood sugars twice a day he did not bring his meter in today we did discuss sensors that he could wear he will check in to see if he can afford this discussed the value of making lifestyle changes now while his diabetes is not severely out-of-control    Patient's most recent   Lab Results   Component Value Date    A1C 7.1 10/07/2024    A1C 5.7 07/23/2018     is not meeting goal of <7.0    Diabetes knowledge and skills assessment:   Patient is knowledgeable in diabetes management concepts related to: Taking Medication and Healthy Coping    Continue education with the following diabetes management concepts: Healthy Eating, Being Active, Monitoring, Taking Medication, Problem Solving, and Reducing Risks    Based on learning assessment above, most appropriate setting for further diabetes education would be: Individual setting.      PLAN  Metformin  mg take 2 pills twice per day with a meal or all 4 at one time  Will discuss again Mounjaro to start  3 meals per day with 45-75 gms of carbs + Proteins.   Snacks 2-3 per day having 1-2 carb choice + proteins     Topics to cover at upcoming visits: Healthy Eating, Being Active, Monitoring, Taking Medication, and Problem Solving    Follow-up:     See Care Plan for co-developed, patient-state behavior change goals.  AVS provided for patient today.    Education Materials Provided:  [employer]  "[place] Understanding Diabetes Booklet, Carbohydrate Counting, Medication Information on Metformin, and My Plate Planner      SUBJECTIVE/OBJECTIVE:  Presents for: Initial Assessment for new diagnosis  Accompanied by: Self  Diabetes education in the past 24mo: No  Focus of Visit: Diabetes Pathophysiology, Assistance w/ making life changes, Healthy Eating, Taking Medication, Monitoring, Being Active  Diabetes type: Type 2  Date of diagnosis: 2024  Disease course: Stable  How confident are you filling out medical forms by yourself:: Extremely  Transportation concerns: No  Difficulty affording diabetes medication?: No  Difficulty affording diabetes testing supplies?: No  Other concerns:: None  Cultural Influences/Ethnic Background:  Not  or       Diabetes Symptoms & Complications:  Diabetes Related Symptoms: Fatigue, Polydipsia (increased thirst), Polyuria (increased urination)  Weight trend: Decreasing  Symptom course: Stable  Disease course: Stable       Patient Problem List and Family Medical History reviewed for relevant medical history, current medical status, and diabetes risk factors.    Vitals:  There were no vitals taken for this visit.  Estimated body mass index is 35.84 kg/m  as calculated from the following:    Height as of 11/1/24: 1.778 m (5' 10\").    Weight as of 11/1/24: 113.3 kg (249 lb 12.8 oz).   Last 3 BP:   BP Readings from Last 3 Encounters:   11/01/24 126/86   10/07/24 (!) 128/94   07/21/22 123/88       History   Smoking Status    Former    Types: Cigarettes   Smokeless Tobacco    Former    Quit date: 3/15/1995       Labs:  Lab Results   Component Value Date    A1C 7.1 10/07/2024    A1C 5.7 07/23/2018     Lab Results   Component Value Date     10/07/2024     11/01/2021     07/17/2018     Lab Results   Component Value Date     10/07/2024     07/17/2018     HDL Cholesterol   Date Value Ref Range Status   07/17/2018 42 >39 mg/dL Final     Direct " "Measure HDL   Date Value Ref Range Status   10/07/2024 47 >=40 mg/dL Final   ]  GFR Estimate   Date Value Ref Range Status   10/07/2024 86 >60 mL/min/1.73m2 Final     Comment:     eGFR calculated using 2021 CKD-EPI equation.   06/29/2015 69 >60 mL/min/1.7m2 Final     Comment:     Non  GFR Calc     GFR Estimate If Black   Date Value Ref Range Status   06/29/2015 84 >60 mL/min/1.7m2 Final     Comment:      GFR Calc     Lab Results   Component Value Date    CR 1.04 10/07/2024    CR 1.15 06/29/2015     No results found for: \"MICROALBUMIN\"    Healthy Eating:  Healthy Eating Assessed Today: Yes  Cultural/Congregation diet restrictions?: No  Do you have any food allergies or intolerances?: No  Meal planning/habits: None  Who cooks/prepares meals for you?: Self, Spouse  Who purchases food in  your home?: Self, Spouse  How many times a week on average do you eat food made away from home (restaurant/take-out)?: 5+  Breakfast: eggs X 3 per wk. omlets  Lunch: home left overs, if on the road, fast foods  Dinner: turkey, mashed potatoes, aspargus, stuffing  Snacks: apples or nuts  Beverages: Water, Coffee, Milk  Has patient met with a dietitian in the past?: No    Being Active:  Being Active Assessed Today: Yes  Exercise:: Currently not exercising    Monitoring:  Monitoring Assessed Today: Yes  Did patient bring glucose meter to appointment? : No  Blood Glucose Meter: Accu-chek  Times checking blood sugar at home (number): 2  Times checking blood sugar at home (per): Day    Did not bring in his glucose meter to look at his numbers    Taking Medications:  Diabetes Medication(s)       Biguanides       metFORMIN (GLUCOPHAGE XR) 500 MG 24 hr tablet Take 2 tablets (1,000 mg) by mouth 2 times daily (with meals).            Taking Medication Assessed Today: Yes  Current Treatments: Oral Medication (taken by mouth)  Problems taking diabetes medications regularly?: No  Diabetes medication side effects?: " No    Problem Solving:                 Reducing Risks:       Healthy Coping:     Patient Activation Measure Survey Score:      6/1/2012     3:00 PM 6/18/2020     9:00 AM   ANNIE Score (Last Two)   ANNIE Raw Score 44 40   Activation Score 70.8 100   ANNIE Level 4 4         Care Plan and Education Provided:  Healthy Eating: Balanced meals, Carbohydrate Counting, Consistency in amount and timing of carbohydrate intake, Label reading, Plate planning method, and Portion control, Being Active: Amount recommended (150 minutes moderate or 75 minutes vigorous activity and 2-3 days strength training per week) and Finding a physical activity routine that works for you, Monitoring: Blood glucose versus Continuous Glucose Monitoring, Frequency of monitoring, Individual glucose targets, Log and interpret results, Purpose, and Proper technique, Taking Medication: Action of prescribed medication(s), Side effects of prescribed medication(s), and When to take medication(s), and Problem Solving: High glucose - causes, signs/symptoms, treatment and prevention, Low glucose - causes, signs/symptoms, treatment and prevention, Rule of 15 and carrying a carbohydrate source at all times in case of low glucose, Safe travel, Sick day arrangements, and When to call a health care provider    Jyoti Mauro RN/CHRISSIE  [place] Diabetes Educator      Time Spent: 90 minutes  Encounter Type: Individual    Any diabetes medication dose changes were made via the [facility] Protocol per the patient's primary care provider. A copy of this encounter was shared with the provider.

## 2025-02-08 ENCOUNTER — HEALTH MAINTENANCE LETTER (OUTPATIENT)
Age: 55
End: 2025-02-08

## 2025-04-23 ENCOUNTER — LAB (OUTPATIENT)
Dept: LAB | Facility: CLINIC | Age: 55
End: 2025-04-23
Payer: COMMERCIAL

## 2025-04-23 DIAGNOSIS — E11.9 TYPE 2 DIABETES MELLITUS WITHOUT COMPLICATION, WITHOUT LONG-TERM CURRENT USE OF INSULIN (H): ICD-10-CM

## 2025-04-23 LAB
ANION GAP SERPL CALCULATED.3IONS-SCNC: 11 MMOL/L (ref 7–15)
BUN SERPL-MCNC: 18.1 MG/DL (ref 6–20)
CALCIUM SERPL-MCNC: 9.8 MG/DL (ref 8.8–10.4)
CHLORIDE SERPL-SCNC: 102 MMOL/L (ref 98–107)
CREAT SERPL-MCNC: 1.16 MG/DL (ref 0.67–1.17)
EGFRCR SERPLBLD CKD-EPI 2021: 75 ML/MIN/1.73M2
EST. AVERAGE GLUCOSE BLD GHB EST-MCNC: 134 MG/DL
GLUCOSE SERPL-MCNC: 130 MG/DL (ref 70–99)
HBA1C MFR BLD: 6.3 % (ref 0–5.6)
HCO3 SERPL-SCNC: 26 MMOL/L (ref 22–29)
POTASSIUM SERPL-SCNC: 4.1 MMOL/L (ref 3.4–5.3)
SODIUM SERPL-SCNC: 139 MMOL/L (ref 135–145)

## 2025-04-23 PROCEDURE — 83036 HEMOGLOBIN GLYCOSYLATED A1C: CPT

## 2025-04-23 PROCEDURE — 80048 BASIC METABOLIC PNL TOTAL CA: CPT

## 2025-04-23 PROCEDURE — 36415 COLL VENOUS BLD VENIPUNCTURE: CPT

## 2025-04-30 ENCOUNTER — OFFICE VISIT (OUTPATIENT)
Dept: FAMILY MEDICINE | Facility: CLINIC | Age: 55
End: 2025-04-30
Payer: COMMERCIAL

## 2025-04-30 ENCOUNTER — TRANSFERRED RECORDS (OUTPATIENT)
Dept: MULTI SPECIALTY CLINIC | Facility: CLINIC | Age: 55
End: 2025-04-30

## 2025-04-30 VITALS
OXYGEN SATURATION: 99 % | WEIGHT: 242 LBS | SYSTOLIC BLOOD PRESSURE: 134 MMHG | RESPIRATION RATE: 21 BRPM | DIASTOLIC BLOOD PRESSURE: 86 MMHG | HEART RATE: 76 BPM | BODY MASS INDEX: 34.65 KG/M2 | TEMPERATURE: 98.5 F | HEIGHT: 70 IN

## 2025-04-30 DIAGNOSIS — E11.9 TYPE 2 DIABETES MELLITUS WITHOUT COMPLICATION, WITHOUT LONG-TERM CURRENT USE OF INSULIN (H): Primary | ICD-10-CM

## 2025-04-30 DIAGNOSIS — C61 PROSTATE CANCER (H): ICD-10-CM

## 2025-04-30 DIAGNOSIS — E66.812 CLASS 2 SEVERE OBESITY WITH BODY MASS INDEX (BMI) OF 35 TO 39.9 WITH SERIOUS COMORBIDITY (H): ICD-10-CM

## 2025-04-30 DIAGNOSIS — E78.5 HYPERLIPIDEMIA LDL GOAL <100: ICD-10-CM

## 2025-04-30 DIAGNOSIS — E66.01 CLASS 2 SEVERE OBESITY WITH BODY MASS INDEX (BMI) OF 35 TO 39.9 WITH SERIOUS COMORBIDITY (H): ICD-10-CM

## 2025-04-30 LAB — RETINOPATHY: NORMAL

## 2025-04-30 PROCEDURE — 3075F SYST BP GE 130 - 139MM HG: CPT | Performed by: FAMILY MEDICINE

## 2025-04-30 PROCEDURE — 3079F DIAST BP 80-89 MM HG: CPT | Performed by: FAMILY MEDICINE

## 2025-04-30 PROCEDURE — 99214 OFFICE O/P EST MOD 30 MIN: CPT | Performed by: FAMILY MEDICINE

## 2025-04-30 PROCEDURE — 1125F AMNT PAIN NOTED PAIN PRSNT: CPT | Performed by: FAMILY MEDICINE

## 2025-04-30 PROCEDURE — G2211 COMPLEX E/M VISIT ADD ON: HCPCS | Performed by: FAMILY MEDICINE

## 2025-04-30 RX ORDER — ATORVASTATIN CALCIUM 10 MG/1
10 TABLET, FILM COATED ORAL DAILY
Qty: 90 TABLET | Refills: 3 | Status: SHIPPED | OUTPATIENT
Start: 2025-04-30

## 2025-04-30 ASSESSMENT — PAIN SCALES - GENERAL: PAINLEVEL_OUTOF10: MILD PAIN (3)

## 2025-04-30 NOTE — PROGRESS NOTES
Assessment & Plan     Type 2 diabetes mellitus without complication, without long-term current use of insulin (H)   I had a long discussion with the patient about his concerns. Diabetes is controlled   Current medications: Metformin 1000 mg twice daily  I recommend the following :  Patient education: In depth discussion and education was provided about the assessment and implications of each of the below recommendations for management. Patient indicated readiness to learn, all questions were answered and understanding of material presented was confirmed.  -Increase exercise as tolerated.   Avoid high glycemic index diet   ASA not taking  -BP: Blood pressure well-controlled  -NAFL/DENNIS: None  -Lipids: Did not tolerate Crestor 20 mg, had muscle pain.  Will switch to Lipitor 10 mg  -Microalbumin; none  -Eyes: last eye exam up-to-date, due in December 2025  -Smoking: none.  Therapy/equipment/braces: None  Medications: Continue metformin, briefly discussed GLP-1 to help with weight loss, he declined at this point   referral / follow up with other providers: None  Follow up: 6 months for physical, previsit labs ordered    - CBC with Platelets & Differential; Future  - Comprehensive metabolic panel; Future  - Hemoglobin A1c; Future    Hyperlipidemia LDL goal <100  Chronic stable problem.  Did not tolerate Crestor 20 mg, will switch to Lipitor 10 mg.  Check fasting profile prior to next visit  - atorvastatin (LIPITOR) 10 MG tablet; Take 1 tablet (10 mg) by mouth daily.  - Lipid panel reflex to direct LDL Fasting; Future    Class 2 severe obesity with body mass index (BMI) of 35 to 39.9 with serious comorbidity (H)  Body mass index is 34.97 kg/m .  Wt Readings from Last 4 Encounters:   04/30/25 109.8 kg (242 lb)   11/01/24 113.3 kg (249 lb 12.8 oz)   10/07/24 115.2 kg (254 lb)   11/08/22 108.9 kg (240 lb)   Associated with hyperlipidemia and diabetes.  Recommended aggressive weight loss.  Discussed diet and  "exercise  Consider GLP-1 if unable to lose weight with diet and exercise  Prostate cancer (H)  History of prostate cancer  Stable, no evidence of recurrence  Continue to monitor  Check PSA next visit  - PSA, tumor marker; Future      BMI  Estimated body mass index is 34.97 kg/m  as calculated from the following:    Height as of this encounter: 1.772 m (5' 9.75\").    Weight as of this encounter: 109.8 kg (242 lb).   Weight management plan: Discussed healthy diet and exercise guidelines      Follow-up       Rolo Davey is a 54 year old, presenting for the following health issues:  Diabetes and Hypertension        4/30/2025     7:15 AM   Additional Questions   Roomed by Bola JOHNS   Accompanied by Self         4/30/2025     7:15 AM   Patient Reported Additional Medications   Patient reports taking the following new medications None     History of Present Illness       Diabetes:   He presents for follow up of diabetes.  He is checking home blood glucose a few times a week.   He checks blood glucose before meals.  Blood glucose is never over 200 and never under 70.  When his blood glucose is low, the patient is asymptomatic for confusion, blurred vision, lethargy and reports not feeling dizzy, shaky, or weak.  He is concerned about other.    He is not experiencing numbness or burning in feet, excessive thirst, blurry vision, weight changes or redness, sores or blisters on feet. The patient has had a diabetic eye exam in the last 12 months. Eye exam performed on December. Location of last eye exam [place].        He eats 2-3 servings of fruits and vegetables daily.He consumes 0 sweetened beverage(s) daily.He exercises with enough effort to increase his heart rate 9 or less minutes per day.  He exercises with enough effort to increase his heart rate 3 or less days per week. He is missing 1 dose(s) of medications per week.        Wt Readings from Last 4 Encounters:   04/30/25 109.8 kg (242 lb)   11/01/24 113.3 kg (249 lb " "12.8 oz)   10/07/24 115.2 kg (254 lb)   11/08/22 108.9 kg (240 lb)     Lab Results   Component Value Date    A1C 6.3 04/23/2025    A1C 7.1 10/07/2024    A1C 5.9 11/01/2021    A1C 5.7 07/23/2018       Review of Systems  Constitutional, HEENT, cardiovascular, pulmonary, gi and gu systems are negative, except as otherwise noted.      Objective    /86   Pulse 76   Temp 98.5  F (36.9  C) (Tympanic)   Resp 21   Ht 1.772 m (5' 9.75\")   Wt 109.8 kg (242 lb)   SpO2 99%   BMI 34.97 kg/m    Body mass index is 34.97 kg/m .  Physical Exam   GENERAL: alert and no distress  NECK: no adenopathy, no asymmetry, masses, or scars  RESP: lungs clear to auscultation - no rales, rhonchi or wheezes  CV: regular rate and rhythm, normal S1 S2, no S3 or S4, no murmur, click or rub, no peripheral edema  ABDOMEN: soft, nontender, no hepatosplenomegaly, no masses and bowel sounds normal  MS: no gross musculoskeletal defects noted, no edema            Signed Electronically by: Mazin Ramires MD    "

## 2025-04-30 NOTE — PATIENT INSTRUCTIONS
Please check blood pressure at home     >Buy a blood pressure cuff and monitor your blood pressure at home.     >When checking your blood pressure at home:    upper arm cuff is preferred versus wrist cuff due to accuracy  Sit in a chair  Rest for 5 minutes  Avoid alcohol, nicotine, caffeine, exercise, food or drink 30 minutes prior  urinate prior to checking if needed  Elbow is at about heart level  Feet flat on the floor, no crossing legs or feet  No talking, minimal movement   Place cuff on bare skin       - Dietary sodium restriction and increase potassium and Calcium intake  - Regular aerobic exercise  - Weight loss  - Avoid regular NSAID use if applicable

## 2025-05-07 ENCOUNTER — RESULTS FOLLOW-UP (OUTPATIENT)
Dept: FAMILY MEDICINE | Facility: CLINIC | Age: 55
End: 2025-05-07

## 2025-07-29 DIAGNOSIS — E11.9 TYPE 2 DIABETES MELLITUS WITHOUT COMPLICATION, WITHOUT LONG-TERM CURRENT USE OF INSULIN (H): ICD-10-CM

## 2025-07-29 RX ORDER — METFORMIN HYDROCHLORIDE 500 MG/1
1000 TABLET, EXTENDED RELEASE ORAL 2 TIMES DAILY WITH MEALS
Qty: 360 TABLET | Refills: 3 | OUTPATIENT
Start: 2025-07-29

## 2025-08-03 ENCOUNTER — HEALTH MAINTENANCE LETTER (OUTPATIENT)
Age: 55
End: 2025-08-03